# Patient Record
Sex: MALE | Race: WHITE | NOT HISPANIC OR LATINO | Employment: UNEMPLOYED | URBAN - NONMETROPOLITAN AREA
[De-identification: names, ages, dates, MRNs, and addresses within clinical notes are randomized per-mention and may not be internally consistent; named-entity substitution may affect disease eponyms.]

---

## 2017-02-09 ENCOUNTER — HOSPITAL ENCOUNTER (OUTPATIENT)
Facility: HOSPITAL | Age: 52
Setting detail: HOSPITAL OUTPATIENT SURGERY
End: 2017-02-09
Attending: SURGERY | Admitting: SURGERY

## 2017-02-09 ENCOUNTER — OFFICE VISIT (OUTPATIENT)
Dept: SURGERY | Facility: CLINIC | Age: 52
End: 2017-02-09

## 2017-02-09 VITALS
DIASTOLIC BLOOD PRESSURE: 84 MMHG | HEIGHT: 73 IN | HEART RATE: 80 BPM | TEMPERATURE: 98 F | BODY MASS INDEX: 26.51 KG/M2 | SYSTOLIC BLOOD PRESSURE: 122 MMHG | WEIGHT: 200 LBS

## 2017-02-09 DIAGNOSIS — Z12.11 ENCOUNTER FOR SCREENING COLONOSCOPY: Primary | ICD-10-CM

## 2017-02-09 PROCEDURE — S0260 H&P FOR SURGERY: HCPCS | Performed by: SURGERY

## 2017-02-09 RX ORDER — HYDROCODONE BITARTRATE AND ACETAMINOPHEN 10; 325 MG/1; MG/1
1 TABLET ORAL EVERY 6 HOURS PRN
COMMUNITY
Start: 2017-02-02

## 2017-02-09 RX ORDER — PRAVASTATIN SODIUM 40 MG
40 TABLET ORAL DAILY
COMMUNITY
Start: 2017-02-02

## 2017-02-16 VITALS — WEIGHT: 205 LBS | BODY MASS INDEX: 27.17 KG/M2 | HEIGHT: 73 IN

## 2017-02-16 RX ORDER — ALBUTEROL SULFATE 90 UG/1
2 AEROSOL, METERED RESPIRATORY (INHALATION) EVERY 4 HOURS PRN
COMMUNITY
End: 2018-11-29 | Stop reason: HOSPADM

## 2017-02-20 ENCOUNTER — OUTSIDE FACILITY SERVICE (OUTPATIENT)
Dept: CARDIOLOGY | Facility: CLINIC | Age: 52
End: 2017-02-20

## 2017-02-20 PROCEDURE — 93306 TTE W/DOPPLER COMPLETE: CPT | Performed by: INTERNAL MEDICINE

## 2017-02-27 ENCOUNTER — TELEPHONE (OUTPATIENT)
Dept: SURGERY | Facility: CLINIC | Age: 52
End: 2017-02-27

## 2017-03-01 ENCOUNTER — TELEPHONE (OUTPATIENT)
Dept: SURGERY | Facility: CLINIC | Age: 52
End: 2017-03-01

## 2017-04-24 ENCOUNTER — PREP FOR SURGERY (OUTPATIENT)
Dept: SURGERY | Facility: CLINIC | Age: 52
End: 2017-04-24

## 2017-04-24 DIAGNOSIS — Z12.11 ENCOUNTER FOR SCREENING COLONOSCOPY: Primary | ICD-10-CM

## 2017-05-09 ENCOUNTER — TELEPHONE (OUTPATIENT)
Dept: SURGERY | Facility: CLINIC | Age: 52
End: 2017-05-09

## 2017-05-10 ENCOUNTER — HOSPITAL ENCOUNTER (OUTPATIENT)
Dept: RADIATION ONCOLOGY | Facility: HOSPITAL | Age: 52
Setting detail: RADIATION/ONCOLOGY SERIES
Discharge: HOME | End: 2017-05-10

## 2017-05-11 ENCOUNTER — PREP FOR SURGERY (OUTPATIENT)
Dept: SURGERY | Facility: CLINIC | Age: 52
End: 2017-05-11

## 2017-05-11 DIAGNOSIS — Z12.11 ENCOUNTER FOR SCREENING COLONOSCOPY: Primary | ICD-10-CM

## 2017-05-11 RX ORDER — SODIUM CHLORIDE 9 MG/ML
30 INJECTION, SOLUTION INTRAVENOUS CONTINUOUS PRN
Status: CANCELLED | OUTPATIENT
Start: 2017-05-11

## 2017-05-15 ENCOUNTER — TELEPHONE (OUTPATIENT)
Dept: SURGERY | Facility: CLINIC | Age: 52
End: 2017-05-15

## 2021-04-13 ENCOUNTER — TRANSCRIBE ORDERS (OUTPATIENT)
Dept: ADMINISTRATIVE | Facility: HOSPITAL | Age: 56
End: 2021-04-13

## 2021-04-13 DIAGNOSIS — Z87.891 PERSONAL HISTORY OF TOBACCO USE, PRESENTING HAZARDS TO HEALTH: Primary | ICD-10-CM

## 2021-04-23 ENCOUNTER — HOSPITAL ENCOUNTER (OUTPATIENT)
Dept: CT IMAGING | Facility: HOSPITAL | Age: 56
Discharge: HOME | End: 2021-04-23
Admitting: FAMILY MEDICINE

## 2021-04-23 DIAGNOSIS — Z87.891 PERSONAL HISTORY OF TOBACCO USE, PRESENTING HAZARDS TO HEALTH: ICD-10-CM

## 2021-04-23 PROCEDURE — 71271 CT THORAX LUNG CANCER SCR C-: CPT | Performed by: RADIOLOGY

## 2021-04-23 PROCEDURE — 71271 CT THORAX LUNG CANCER SCR C-: CPT

## 2023-04-14 ENCOUNTER — LAB (OUTPATIENT)
Dept: LAB | Facility: HOSPITAL | Age: 58
End: 2023-04-14
Payer: MEDICARE

## 2023-04-14 ENCOUNTER — TRANSCRIBE ORDERS (OUTPATIENT)
Dept: LAB | Facility: HOSPITAL | Age: 58
End: 2023-04-14
Payer: MEDICARE

## 2023-04-14 ENCOUNTER — HOSPITAL ENCOUNTER (OUTPATIENT)
Dept: GENERAL RADIOLOGY | Facility: HOSPITAL | Age: 58
Discharge: HOME | End: 2023-04-14
Payer: MEDICARE

## 2023-04-14 DIAGNOSIS — R05.9 COUGH, UNSPECIFIED TYPE: ICD-10-CM

## 2023-04-14 DIAGNOSIS — R97.20 ELEVATED PSA: Primary | ICD-10-CM

## 2023-04-14 DIAGNOSIS — M54.2 CERVICALGIA: ICD-10-CM

## 2023-04-14 DIAGNOSIS — M54.2 CERVICALGIA: Primary | ICD-10-CM

## 2023-04-14 DIAGNOSIS — R05.9 COUGH, UNSPECIFIED TYPE: Primary | ICD-10-CM

## 2023-04-14 DIAGNOSIS — R97.20 ELEVATED PSA: ICD-10-CM

## 2023-04-14 PROCEDURE — 71046 X-RAY EXAM CHEST 2 VIEWS: CPT

## 2023-04-14 PROCEDURE — 72040 X-RAY EXAM NECK SPINE 2-3 VW: CPT

## 2023-04-14 PROCEDURE — 72040 X-RAY EXAM NECK SPINE 2-3 VW: CPT | Performed by: RADIOLOGY

## 2023-04-14 PROCEDURE — 71046 X-RAY EXAM CHEST 2 VIEWS: CPT | Performed by: RADIOLOGY

## 2024-01-31 ENCOUNTER — TRANSCRIBE ORDERS (OUTPATIENT)
Dept: ADMINISTRATIVE | Facility: HOSPITAL | Age: 59
End: 2024-01-31
Payer: MEDICARE

## 2024-01-31 ENCOUNTER — HOSPITAL ENCOUNTER (OUTPATIENT)
Dept: GENERAL RADIOLOGY | Facility: HOSPITAL | Age: 59
Discharge: HOME | End: 2024-01-31
Payer: MEDICARE

## 2024-01-31 DIAGNOSIS — M54.50 LOW BACK PAIN, UNSPECIFIED BACK PAIN LATERALITY, UNSPECIFIED CHRONICITY, UNSPECIFIED WHETHER SCIATICA PRESENT: ICD-10-CM

## 2024-01-31 DIAGNOSIS — R05.9 COUGH, UNSPECIFIED TYPE: ICD-10-CM

## 2024-01-31 DIAGNOSIS — R05.9 COUGH, UNSPECIFIED TYPE: Primary | ICD-10-CM

## 2024-01-31 DIAGNOSIS — M54.50 LOW BACK PAIN, UNSPECIFIED BACK PAIN LATERALITY, UNSPECIFIED CHRONICITY, UNSPECIFIED WHETHER SCIATICA PRESENT: Primary | ICD-10-CM

## 2024-01-31 PROCEDURE — 72100 X-RAY EXAM L-S SPINE 2/3 VWS: CPT

## 2024-01-31 PROCEDURE — 71046 X-RAY EXAM CHEST 2 VIEWS: CPT | Performed by: RADIOLOGY

## 2024-01-31 PROCEDURE — 72100 X-RAY EXAM L-S SPINE 2/3 VWS: CPT | Performed by: RADIOLOGY

## 2024-01-31 PROCEDURE — 71046 X-RAY EXAM CHEST 2 VIEWS: CPT

## 2024-02-13 ENCOUNTER — TRANSCRIBE ORDERS (OUTPATIENT)
Dept: ADMINISTRATIVE | Facility: HOSPITAL | Age: 59
End: 2024-02-13
Payer: MEDICARE

## 2024-02-13 DIAGNOSIS — M54.16 RADICULOPATHY, LUMBAR REGION: Primary | ICD-10-CM

## 2024-02-21 ENCOUNTER — HOSPITAL ENCOUNTER (OUTPATIENT)
Dept: MRI IMAGING | Facility: HOSPITAL | Age: 59
Discharge: HOME | End: 2024-02-21
Payer: MEDICARE

## 2024-02-21 DIAGNOSIS — M54.16 RADICULOPATHY, LUMBAR REGION: ICD-10-CM

## 2024-02-21 PROCEDURE — 72148 MRI LUMBAR SPINE W/O DYE: CPT

## 2024-02-21 PROCEDURE — 72148 MRI LUMBAR SPINE W/O DYE: CPT | Performed by: RADIOLOGY

## 2024-09-04 ENCOUNTER — APPOINTMENT (OUTPATIENT)
Dept: GENERAL RADIOLOGY | Facility: HOSPITAL | Age: 59
DRG: 871 | End: 2024-09-04
Payer: MEDICARE

## 2024-09-04 ENCOUNTER — HOSPITAL ENCOUNTER (INPATIENT)
Facility: HOSPITAL | Age: 59
LOS: 4 days | Discharge: HOME | DRG: 871 | End: 2024-09-08
Attending: STUDENT IN AN ORGANIZED HEALTH CARE EDUCATION/TRAINING PROGRAM | Admitting: STUDENT IN AN ORGANIZED HEALTH CARE EDUCATION/TRAINING PROGRAM
Payer: MEDICARE

## 2024-09-04 ENCOUNTER — APPOINTMENT (OUTPATIENT)
Dept: CARDIOLOGY | Facility: HOSPITAL | Age: 59
DRG: 871 | End: 2024-09-04
Payer: MEDICARE

## 2024-09-04 ENCOUNTER — APPOINTMENT (OUTPATIENT)
Dept: CT IMAGING | Facility: HOSPITAL | Age: 59
DRG: 871 | End: 2024-09-04
Payer: MEDICARE

## 2024-09-04 DIAGNOSIS — J44.9 CHRONIC OBSTRUCTIVE PULMONARY DISEASE (COPD): ICD-10-CM

## 2024-09-04 DIAGNOSIS — J96.01 ACUTE HYPOXEMIC RESPIRATORY FAILURE: ICD-10-CM

## 2024-09-04 DIAGNOSIS — J96.22 ACUTE ON CHRONIC RESPIRATORY FAILURE WITH HYPOXIA AND HYPERCAPNIA: Primary | ICD-10-CM

## 2024-09-04 DIAGNOSIS — J44.1 COPD EXACERBATION: ICD-10-CM

## 2024-09-04 DIAGNOSIS — J96.21 ACUTE ON CHRONIC RESPIRATORY FAILURE WITH HYPOXIA AND HYPERCAPNIA: Primary | ICD-10-CM

## 2024-09-04 DIAGNOSIS — J18.9 PNEUMONIA DUE TO INFECTIOUS ORGANISM, UNSPECIFIED LATERALITY, UNSPECIFIED PART OF LUNG: ICD-10-CM

## 2024-09-04 DIAGNOSIS — J96.10 CHRONIC RESPIRATORY FAILURE: ICD-10-CM

## 2024-09-04 LAB
A-A DO2: 204.4 MMHG (ref 0–300)
A-A DO2: 77.1 MMHG (ref 0–300)
ALBUMIN SERPL-MCNC: 3.7 G/DL (ref 3.5–5.2)
ALBUMIN/GLOB SERPL: 0.9 G/DL
ALP SERPL-CCNC: 87 U/L (ref 39–117)
ALT SERPL W P-5'-P-CCNC: 15 U/L (ref 1–41)
ANION GAP SERPL CALCULATED.3IONS-SCNC: 9.9 MMOL/L (ref 5–15)
ARTERIAL PATENCY WRIST A: ABNORMAL
ARTERIAL PATENCY WRIST A: POSITIVE
AST SERPL-CCNC: 16 U/L (ref 1–40)
ATMOSPHERIC PRESS: 734 MMHG
ATMOSPHERIC PRESS: 734 MMHG
B PARAPERT DNA SPEC QL NAA+PROBE: NOT DETECTED
B PERT DNA SPEC QL NAA+PROBE: NOT DETECTED
BASE EXCESS BLDA CALC-SCNC: 7.7 MMOL/L (ref 0–2)
BASE EXCESS BLDA CALC-SCNC: 7.9 MMOL/L (ref 0–2)
BASOPHILS # BLD AUTO: 0.09 10*3/MM3 (ref 0–0.2)
BASOPHILS NFR BLD AUTO: 0.4 % (ref 0–1.5)
BDY SITE: ABNORMAL
BDY SITE: ABNORMAL
BH CV ECHO MEAS - AO MAX PG: 7.4 MMHG
BH CV ECHO MEAS - AO MEAN PG: 4 MMHG
BH CV ECHO MEAS - AO ROOT DIAM: 4.2 CM
BH CV ECHO MEAS - AO V2 MAX: 136 CM/SEC
BH CV ECHO MEAS - AO V2 VTI: 26.6 CM
BH CV ECHO MEAS - EDV(CUBED): 75.2 ML
BH CV ECHO MEAS - EDV(MOD-SP4): 60.1 ML
BH CV ECHO MEAS - EF(MOD-SP4): 58.9 %
BH CV ECHO MEAS - ESV(CUBED): 28.4 ML
BH CV ECHO MEAS - ESV(MOD-SP4): 24.7 ML
BH CV ECHO MEAS - FS: 27.7 %
BH CV ECHO MEAS - IVS/LVPW: 0.96 CM
BH CV ECHO MEAS - IVSD: 1.28 CM
BH CV ECHO MEAS - LA DIMENSION: 2.8 CM
BH CV ECHO MEAS - LV DIASTOLIC VOL/BSA (35-75): 26.8 CM2
BH CV ECHO MEAS - LV MASS(C)D: 203.2 GRAMS
BH CV ECHO MEAS - LV SYSTOLIC VOL/BSA (12-30): 11 CM2
BH CV ECHO MEAS - LVIDD: 4.2 CM
BH CV ECHO MEAS - LVIDS: 3.1 CM
BH CV ECHO MEAS - LVOT AREA: 2.8 CM2
BH CV ECHO MEAS - LVOT DIAM: 1.9 CM
BH CV ECHO MEAS - LVPWD: 1.33 CM
BH CV ECHO MEAS - PA ACC TIME: 0.12 SEC
BH CV ECHO MEAS - RAP SYSTOLE: 10 MMHG
BH CV ECHO MEAS - RVSP: 50.4 MMHG
BH CV ECHO MEAS - SV(MOD-SP4): 35.4 ML
BH CV ECHO MEAS - SVI(MOD-SP4): 15.8 ML/M2
BH CV ECHO MEAS - TR MAX PG: 40.4 MMHG
BH CV ECHO MEAS - TR MAX VEL: 318 CM/SEC
BILIRUB SERPL-MCNC: 0.3 MG/DL (ref 0–1.2)
BUN SERPL-MCNC: 11 MG/DL (ref 8–23)
BUN/CREAT SERPL: 15.1 (ref 7–25)
C PNEUM DNA NPH QL NAA+NON-PROBE: NOT DETECTED
CALCIUM SPEC-SCNC: 9.5 MG/DL (ref 8.6–10.5)
CHLORIDE SERPL-SCNC: 92 MMOL/L (ref 98–107)
CO2 BLDA-SCNC: 38.6 MMOL/L (ref 22–33)
CO2 BLDA-SCNC: 38.6 MMOL/L (ref 22–33)
CO2 SERPL-SCNC: 33.1 MMOL/L (ref 22–29)
COHGB MFR BLD: 2.9 % (ref 0–5)
COHGB MFR BLD: 3.1 % (ref 0–5)
CREAT SERPL-MCNC: 0.73 MG/DL (ref 0.76–1.27)
CRP SERPL-MCNC: 10.39 MG/DL (ref 0–0.5)
D DIMER PPP FEU-MCNC: 0.39 MCGFEU/ML (ref 0–0.6)
D-LACTATE SERPL-SCNC: 0.9 MMOL/L (ref 0.5–2)
DEPRECATED RDW RBC AUTO: 48.9 FL (ref 37–54)
EGFRCR SERPLBLD CKD-EPI 2021: 104.2 ML/MIN/1.73
EOSINOPHIL # BLD AUTO: 0 10*3/MM3 (ref 0–0.4)
EOSINOPHIL NFR BLD AUTO: 0 % (ref 0.3–6.2)
ERYTHROCYTE [DISTWIDTH] IN BLOOD BY AUTOMATED COUNT: 13.5 % (ref 12.3–15.4)
ERYTHROCYTE [SEDIMENTATION RATE] IN BLOOD: 42 MM/HR (ref 0–20)
FLUAV SUBTYP SPEC NAA+PROBE: NOT DETECTED
FLUBV RNA ISLT QL NAA+PROBE: NOT DETECTED
GAS FLOW AIRWAY: 2 LPM
GAS FLOW AIRWAY: 60 LPM
GEN 5 2HR TROPONIN T REFLEX: 11 NG/L
GLOBULIN UR ELPH-MCNC: 4.1 GM/DL
GLUCOSE SERPL-MCNC: 161 MG/DL (ref 65–99)
HADV DNA SPEC NAA+PROBE: NOT DETECTED
HCO3 BLDA-SCNC: 36.5 MMOL/L (ref 20–26)
HCO3 BLDA-SCNC: 36.6 MMOL/L (ref 20–26)
HCOV 229E RNA SPEC QL NAA+PROBE: NOT DETECTED
HCOV HKU1 RNA SPEC QL NAA+PROBE: NOT DETECTED
HCOV NL63 RNA SPEC QL NAA+PROBE: NOT DETECTED
HCOV OC43 RNA SPEC QL NAA+PROBE: NOT DETECTED
HCT VFR BLD AUTO: 50.4 % (ref 37.5–51)
HCT VFR BLD CALC: 46.8 % (ref 38–51)
HCT VFR BLD CALC: 48.1 % (ref 38–51)
HGB BLD-MCNC: 15.8 G/DL (ref 13–17.7)
HGB BLDA-MCNC: 15.3 G/DL (ref 14–18)
HGB BLDA-MCNC: 15.7 G/DL (ref 14–18)
HMPV RNA NPH QL NAA+NON-PROBE: NOT DETECTED
HOLD SPECIMEN: NORMAL
HOLD SPECIMEN: NORMAL
HPIV1 RNA ISLT QL NAA+PROBE: NOT DETECTED
HPIV2 RNA SPEC QL NAA+PROBE: NOT DETECTED
HPIV3 RNA NPH QL NAA+PROBE: NOT DETECTED
HPIV4 P GENE NPH QL NAA+PROBE: NOT DETECTED
IMM GRANULOCYTES # BLD AUTO: 0.17 10*3/MM3 (ref 0–0.05)
IMM GRANULOCYTES NFR BLD AUTO: 0.7 % (ref 0–0.5)
INHALED O2 CONCENTRATION: 28 %
INHALED O2 CONCENTRATION: 51 %
INR PPP: 1.09 (ref 0.9–1.1)
LYMPHOCYTES # BLD AUTO: 1.18 10*3/MM3 (ref 0.7–3.1)
LYMPHOCYTES NFR BLD AUTO: 4.6 % (ref 19.6–45.3)
Lab: ABNORMAL
M PNEUMO IGG SER IA-ACNC: NOT DETECTED
MCH RBC QN AUTO: 30.6 PG (ref 26.6–33)
MCHC RBC AUTO-ENTMCNC: 31.3 G/DL (ref 31.5–35.7)
MCV RBC AUTO: 97.5 FL (ref 79–97)
METHGB BLD QL: 0.2 % (ref 0–3)
METHGB BLD QL: 0.2 % (ref 0–3)
MODALITY: ABNORMAL
MODALITY: ABNORMAL
MONOCYTES # BLD AUTO: 1.35 10*3/MM3 (ref 0.1–0.9)
MONOCYTES NFR BLD AUTO: 5.3 % (ref 5–12)
MRSA DNA SPEC QL NAA+PROBE: ABNORMAL
NEUTROPHILS NFR BLD AUTO: 22.61 10*3/MM3 (ref 1.7–7)
NEUTROPHILS NFR BLD AUTO: 89 % (ref 42.7–76)
NOTIFIED BY: ABNORMAL
NOTIFIED BY: ABNORMAL
NOTIFIED WHO: ABNORMAL
NOTIFIED WHO: ABNORMAL
NRBC BLD AUTO-RTO: 0 /100 WBC (ref 0–0.2)
NT-PROBNP SERPL-MCNC: 458.9 PG/ML (ref 0–900)
OXYHGB MFR BLDV: 69.3 % (ref 94–99)
OXYHGB MFR BLDV: 91 % (ref 94–99)
PCO2 BLDA: 67.7 MM HG (ref 35–45)
PCO2 BLDA: 67.8 MM HG (ref 35–45)
PCO2 TEMP ADJ BLD: ABNORMAL MM[HG]
PCO2 TEMP ADJ BLD: ABNORMAL MM[HG]
PH BLDA: 7.34 PH UNITS (ref 7.35–7.45)
PH BLDA: 7.34 PH UNITS (ref 7.35–7.45)
PH, TEMP CORRECTED: ABNORMAL
PH, TEMP CORRECTED: ABNORMAL
PLATELET # BLD AUTO: 388 10*3/MM3 (ref 140–450)
PMV BLD AUTO: 9.6 FL (ref 6–12)
PO2 BLDA: 39.5 MM HG (ref 83–108)
PO2 BLDA: 73 MM HG (ref 83–108)
PO2 TEMP ADJ BLD: ABNORMAL MM[HG]
PO2 TEMP ADJ BLD: ABNORMAL MM[HG]
POTASSIUM SERPL-SCNC: 4.1 MMOL/L (ref 3.5–5.2)
PROCALCITONIN SERPL-MCNC: 0.06 NG/ML (ref 0–0.25)
PROT SERPL-MCNC: 7.8 G/DL (ref 6–8.5)
PROTHROMBIN TIME: 14.3 SECONDS (ref 12.1–14.7)
QT INTERVAL: 368 MS
QTC INTERVAL: 467 MS
RBC # BLD AUTO: 5.17 10*6/MM3 (ref 4.14–5.8)
RHINOVIRUS RNA SPEC NAA+PROBE: DETECTED
RSV RNA NPH QL NAA+NON-PROBE: NOT DETECTED
SAO2 % BLDCOA: 71.7 % (ref 94–99)
SAO2 % BLDCOA: 93.9 % (ref 94–99)
SARS-COV-2 RNA NPH QL NAA+NON-PROBE: NOT DETECTED
SODIUM SERPL-SCNC: 135 MMOL/L (ref 136–145)
TROPONIN T DELTA: -3 NG/L
TROPONIN T SERPL HS-MCNC: 14 NG/L
TROPONIN T SERPL HS-MCNC: 17 NG/L
VENTILATOR MODE: ABNORMAL
VENTILATOR MODE: ABNORMAL
WBC NRBC COR # BLD AUTO: 25.4 10*3/MM3 (ref 3.4–10.8)
WHOLE BLOOD HOLD COAG: NORMAL
WHOLE BLOOD HOLD SPECIMEN: NORMAL

## 2024-09-04 PROCEDURE — 71275 CT ANGIOGRAPHY CHEST: CPT | Performed by: RADIOLOGY

## 2024-09-04 PROCEDURE — 85025 COMPLETE CBC W/AUTO DIFF WBC: CPT | Performed by: STUDENT IN AN ORGANIZED HEALTH CARE EDUCATION/TRAINING PROGRAM

## 2024-09-04 PROCEDURE — 87641 MR-STAPH DNA AMP PROBE: CPT | Performed by: STUDENT IN AN ORGANIZED HEALTH CARE EDUCATION/TRAINING PROGRAM

## 2024-09-04 PROCEDURE — 99285 EMERGENCY DEPT VISIT HI MDM: CPT

## 2024-09-04 PROCEDURE — 93306 TTE W/DOPPLER COMPLETE: CPT

## 2024-09-04 PROCEDURE — 25010000002 METHYLPREDNISOLONE PER 125 MG: Performed by: STUDENT IN AN ORGANIZED HEALTH CARE EDUCATION/TRAINING PROGRAM

## 2024-09-04 PROCEDURE — 82375 ASSAY CARBOXYHB QUANT: CPT

## 2024-09-04 PROCEDURE — 71045 X-RAY EXAM CHEST 1 VIEW: CPT | Performed by: RADIOLOGY

## 2024-09-04 PROCEDURE — 71275 CT ANGIOGRAPHY CHEST: CPT

## 2024-09-04 PROCEDURE — 93010 ELECTROCARDIOGRAM REPORT: CPT | Performed by: STUDENT IN AN ORGANIZED HEALTH CARE EDUCATION/TRAINING PROGRAM

## 2024-09-04 PROCEDURE — 86738 MYCOPLASMA ANTIBODY: CPT | Performed by: STUDENT IN AN ORGANIZED HEALTH CARE EDUCATION/TRAINING PROGRAM

## 2024-09-04 PROCEDURE — 93005 ELECTROCARDIOGRAM TRACING: CPT | Performed by: STUDENT IN AN ORGANIZED HEALTH CARE EDUCATION/TRAINING PROGRAM

## 2024-09-04 PROCEDURE — 25510000001 IOPAMIDOL PER 1 ML: Performed by: STUDENT IN AN ORGANIZED HEALTH CARE EDUCATION/TRAINING PROGRAM

## 2024-09-04 PROCEDURE — 83050 HGB METHEMOGLOBIN QUAN: CPT

## 2024-09-04 PROCEDURE — 36415 COLL VENOUS BLD VENIPUNCTURE: CPT | Performed by: STUDENT IN AN ORGANIZED HEALTH CARE EDUCATION/TRAINING PROGRAM

## 2024-09-04 PROCEDURE — 94664 DEMO&/EVAL PT USE INHALER: CPT

## 2024-09-04 PROCEDURE — 25010000002 VANCOMYCIN 5 G RECONSTITUTED SOLUTION: Performed by: STUDENT IN AN ORGANIZED HEALTH CARE EDUCATION/TRAINING PROGRAM

## 2024-09-04 PROCEDURE — 85610 PROTHROMBIN TIME: CPT | Performed by: STUDENT IN AN ORGANIZED HEALTH CARE EDUCATION/TRAINING PROGRAM

## 2024-09-04 PROCEDURE — 80053 COMPREHEN METABOLIC PANEL: CPT | Performed by: STUDENT IN AN ORGANIZED HEALTH CARE EDUCATION/TRAINING PROGRAM

## 2024-09-04 PROCEDURE — 94799 UNLISTED PULMONARY SVC/PX: CPT

## 2024-09-04 PROCEDURE — 25010000002 AZITHROMYCIN PER 500 MG: Performed by: STUDENT IN AN ORGANIZED HEALTH CARE EDUCATION/TRAINING PROGRAM

## 2024-09-04 PROCEDURE — 83605 ASSAY OF LACTIC ACID: CPT | Performed by: STUDENT IN AN ORGANIZED HEALTH CARE EDUCATION/TRAINING PROGRAM

## 2024-09-04 PROCEDURE — 83880 ASSAY OF NATRIURETIC PEPTIDE: CPT | Performed by: STUDENT IN AN ORGANIZED HEALTH CARE EDUCATION/TRAINING PROGRAM

## 2024-09-04 PROCEDURE — 25810000003 SODIUM CHLORIDE 0.9 % SOLUTION 250 ML FLEX CONT: Performed by: STUDENT IN AN ORGANIZED HEALTH CARE EDUCATION/TRAINING PROGRAM

## 2024-09-04 PROCEDURE — 85652 RBC SED RATE AUTOMATED: CPT | Performed by: STUDENT IN AN ORGANIZED HEALTH CARE EDUCATION/TRAINING PROGRAM

## 2024-09-04 PROCEDURE — 0202U NFCT DS 22 TRGT SARS-COV-2: CPT | Performed by: STUDENT IN AN ORGANIZED HEALTH CARE EDUCATION/TRAINING PROGRAM

## 2024-09-04 PROCEDURE — 84484 ASSAY OF TROPONIN QUANT: CPT | Performed by: STUDENT IN AN ORGANIZED HEALTH CARE EDUCATION/TRAINING PROGRAM

## 2024-09-04 PROCEDURE — 25810000003 SODIUM CHLORIDE 0.9 % SOLUTION: Performed by: STUDENT IN AN ORGANIZED HEALTH CARE EDUCATION/TRAINING PROGRAM

## 2024-09-04 PROCEDURE — 25010000002 CEFTRIAXONE PER 250 MG: Performed by: STUDENT IN AN ORGANIZED HEALTH CARE EDUCATION/TRAINING PROGRAM

## 2024-09-04 PROCEDURE — 36600 WITHDRAWAL OF ARTERIAL BLOOD: CPT

## 2024-09-04 PROCEDURE — 87070 CULTURE OTHR SPECIMN AEROBIC: CPT | Performed by: STUDENT IN AN ORGANIZED HEALTH CARE EDUCATION/TRAINING PROGRAM

## 2024-09-04 PROCEDURE — 25010000002 CEFEPIME PER 500 MG: Performed by: STUDENT IN AN ORGANIZED HEALTH CARE EDUCATION/TRAINING PROGRAM

## 2024-09-04 PROCEDURE — 99291 CRITICAL CARE FIRST HOUR: CPT | Performed by: STUDENT IN AN ORGANIZED HEALTH CARE EDUCATION/TRAINING PROGRAM

## 2024-09-04 PROCEDURE — 71045 X-RAY EXAM CHEST 1 VIEW: CPT

## 2024-09-04 PROCEDURE — 87205 SMEAR GRAM STAIN: CPT | Performed by: STUDENT IN AN ORGANIZED HEALTH CARE EDUCATION/TRAINING PROGRAM

## 2024-09-04 PROCEDURE — 94640 AIRWAY INHALATION TREATMENT: CPT

## 2024-09-04 PROCEDURE — 87077 CULTURE AEROBIC IDENTIFY: CPT | Performed by: STUDENT IN AN ORGANIZED HEALTH CARE EDUCATION/TRAINING PROGRAM

## 2024-09-04 PROCEDURE — 36415 COLL VENOUS BLD VENIPUNCTURE: CPT

## 2024-09-04 PROCEDURE — 85379 FIBRIN DEGRADATION QUANT: CPT | Performed by: STUDENT IN AN ORGANIZED HEALTH CARE EDUCATION/TRAINING PROGRAM

## 2024-09-04 PROCEDURE — 87040 BLOOD CULTURE FOR BACTERIA: CPT | Performed by: STUDENT IN AN ORGANIZED HEALTH CARE EDUCATION/TRAINING PROGRAM

## 2024-09-04 PROCEDURE — 93306 TTE W/DOPPLER COMPLETE: CPT | Performed by: INTERNAL MEDICINE

## 2024-09-04 PROCEDURE — 84145 PROCALCITONIN (PCT): CPT | Performed by: STUDENT IN AN ORGANIZED HEALTH CARE EDUCATION/TRAINING PROGRAM

## 2024-09-04 PROCEDURE — 82805 BLOOD GASES W/O2 SATURATION: CPT

## 2024-09-04 PROCEDURE — 86140 C-REACTIVE PROTEIN: CPT | Performed by: STUDENT IN AN ORGANIZED HEALTH CARE EDUCATION/TRAINING PROGRAM

## 2024-09-04 PROCEDURE — 87185 SC STD ENZYME DETCJ PER NZM: CPT | Performed by: STUDENT IN AN ORGANIZED HEALTH CARE EDUCATION/TRAINING PROGRAM

## 2024-09-04 PROCEDURE — 25010000002 ENOXAPARIN PER 10 MG: Performed by: STUDENT IN AN ORGANIZED HEALTH CARE EDUCATION/TRAINING PROGRAM

## 2024-09-04 RX ORDER — ALBUTEROL SULFATE 90 UG/1
2 AEROSOL, METERED RESPIRATORY (INHALATION) EVERY 6 HOURS PRN
Status: CANCELLED | OUTPATIENT
Start: 2024-09-04

## 2024-09-04 RX ORDER — PREDNISONE 20 MG/1
TABLET ORAL
COMMUNITY
Start: 2024-08-28 | End: 2024-09-08 | Stop reason: HOSPADM

## 2024-09-04 RX ORDER — BISACODYL 5 MG/1
5 TABLET, DELAYED RELEASE ORAL DAILY PRN
Status: DISCONTINUED | OUTPATIENT
Start: 2024-09-04 | End: 2024-09-08 | Stop reason: HOSPADM

## 2024-09-04 RX ORDER — IPRATROPIUM BROMIDE AND ALBUTEROL SULFATE 2.5; .5 MG/3ML; MG/3ML
SOLUTION RESPIRATORY (INHALATION)
Status: COMPLETED
Start: 2024-09-04 | End: 2024-09-04

## 2024-09-04 RX ORDER — AMOXICILLIN 250 MG
2 CAPSULE ORAL 2 TIMES DAILY PRN
Status: DISCONTINUED | OUTPATIENT
Start: 2024-09-04 | End: 2024-09-08 | Stop reason: HOSPADM

## 2024-09-04 RX ORDER — METHYLPREDNISOLONE SODIUM SUCCINATE 125 MG/2ML
125 INJECTION, POWDER, LYOPHILIZED, FOR SOLUTION INTRAMUSCULAR; INTRAVENOUS ONCE
Status: COMPLETED | OUTPATIENT
Start: 2024-09-04 | End: 2024-09-04

## 2024-09-04 RX ORDER — METHYLPREDNISOLONE SODIUM SUCCINATE 125 MG/2ML
60 INJECTION, POWDER, LYOPHILIZED, FOR SOLUTION INTRAMUSCULAR; INTRAVENOUS EVERY 8 HOURS
Status: DISCONTINUED | OUTPATIENT
Start: 2024-09-04 | End: 2024-09-06

## 2024-09-04 RX ORDER — PANTOPRAZOLE SODIUM 40 MG/1
40 TABLET, DELAYED RELEASE ORAL
Status: DISCONTINUED | OUTPATIENT
Start: 2024-09-05 | End: 2024-09-08 | Stop reason: HOSPADM

## 2024-09-04 RX ORDER — DOXYCYCLINE 100 MG/1
100 CAPSULE ORAL EVERY 12 HOURS SCHEDULED
Status: DISCONTINUED | OUTPATIENT
Start: 2024-09-04 | End: 2024-09-08 | Stop reason: HOSPADM

## 2024-09-04 RX ORDER — SODIUM CHLORIDE 9 MG/ML
40 INJECTION, SOLUTION INTRAVENOUS AS NEEDED
Status: DISCONTINUED | OUTPATIENT
Start: 2024-09-04 | End: 2024-09-08 | Stop reason: HOSPADM

## 2024-09-04 RX ORDER — SODIUM CHLORIDE 0.9 % (FLUSH) 0.9 %
10 SYRINGE (ML) INJECTION EVERY 12 HOURS SCHEDULED
Status: DISCONTINUED | OUTPATIENT
Start: 2024-09-04 | End: 2024-09-08 | Stop reason: HOSPADM

## 2024-09-04 RX ORDER — GUAIFENESIN 600 MG/1
1200 TABLET, EXTENDED RELEASE ORAL EVERY 12 HOURS SCHEDULED
Status: DISCONTINUED | OUTPATIENT
Start: 2024-09-04 | End: 2024-09-08 | Stop reason: HOSPADM

## 2024-09-04 RX ORDER — FLUTICASONE FUROATE, UMECLIDINIUM BROMIDE AND VILANTEROL TRIFENATATE 200; 62.5; 25 UG/1; UG/1; UG/1
1 POWDER RESPIRATORY (INHALATION)
COMMUNITY
Start: 2024-08-02 | End: 2024-09-11 | Stop reason: SDUPTHER

## 2024-09-04 RX ORDER — NITROGLYCERIN 0.4 MG/1
0.4 TABLET SUBLINGUAL
Status: DISCONTINUED | OUTPATIENT
Start: 2024-09-04 | End: 2024-09-08 | Stop reason: HOSPADM

## 2024-09-04 RX ORDER — HYDROCODONE BITARTRATE AND ACETAMINOPHEN 10; 325 MG/1; MG/1
1.5 TABLET ORAL DAILY PRN
Status: DISCONTINUED | OUTPATIENT
Start: 2024-09-04 | End: 2024-09-08 | Stop reason: HOSPADM

## 2024-09-04 RX ORDER — POLYETHYLENE GLYCOL 3350 17 G/17G
17 POWDER, FOR SOLUTION ORAL DAILY PRN
Status: DISCONTINUED | OUTPATIENT
Start: 2024-09-04 | End: 2024-09-08 | Stop reason: HOSPADM

## 2024-09-04 RX ORDER — ENOXAPARIN SODIUM 100 MG/ML
40 INJECTION SUBCUTANEOUS DAILY
Status: DISCONTINUED | OUTPATIENT
Start: 2024-09-04 | End: 2024-09-08 | Stop reason: HOSPADM

## 2024-09-04 RX ORDER — IPRATROPIUM BROMIDE AND ALBUTEROL SULFATE 2.5; .5 MG/3ML; MG/3ML
3 SOLUTION RESPIRATORY (INHALATION)
Status: DISCONTINUED | OUTPATIENT
Start: 2024-09-04 | End: 2024-09-07

## 2024-09-04 RX ORDER — IPRATROPIUM BROMIDE AND ALBUTEROL SULFATE 2.5; .5 MG/3ML; MG/3ML
3 SOLUTION RESPIRATORY (INHALATION) ONCE
Status: COMPLETED | OUTPATIENT
Start: 2024-09-04 | End: 2024-09-04

## 2024-09-04 RX ORDER — VANCOMYCIN 2 GRAM/500 ML IN 0.9 % SODIUM CHLORIDE INTRAVENOUS
20 ONCE
Status: COMPLETED | OUTPATIENT
Start: 2024-09-04 | End: 2024-09-04

## 2024-09-04 RX ORDER — SODIUM CHLORIDE 0.9 % (FLUSH) 0.9 %
10 SYRINGE (ML) INJECTION AS NEEDED
Status: DISCONTINUED | OUTPATIENT
Start: 2024-09-04 | End: 2024-09-08 | Stop reason: HOSPADM

## 2024-09-04 RX ORDER — BISACODYL 10 MG
10 SUPPOSITORY, RECTAL RECTAL DAILY PRN
Status: DISCONTINUED | OUTPATIENT
Start: 2024-09-04 | End: 2024-09-08 | Stop reason: HOSPADM

## 2024-09-04 RX ORDER — GABAPENTIN 300 MG/1
300 CAPSULE ORAL 3 TIMES DAILY PRN
Status: DISCONTINUED | OUTPATIENT
Start: 2024-09-04 | End: 2024-09-08 | Stop reason: HOSPADM

## 2024-09-04 RX ORDER — BUDESONIDE AND FORMOTEROL FUMARATE DIHYDRATE 160; 4.5 UG/1; UG/1
2 AEROSOL RESPIRATORY (INHALATION)
Status: DISCONTINUED | OUTPATIENT
Start: 2024-09-04 | End: 2024-09-08 | Stop reason: HOSPADM

## 2024-09-04 RX ORDER — PREDNISONE 20 MG/1
TABLET ORAL
Status: CANCELLED | OUTPATIENT
Start: 2024-09-04

## 2024-09-04 RX ORDER — GABAPENTIN 300 MG/1
300 CAPSULE ORAL 3 TIMES DAILY PRN
COMMUNITY
Start: 2024-08-06

## 2024-09-04 RX ORDER — ACETAMINOPHEN 325 MG/1
650 TABLET ORAL EVERY 6 HOURS PRN
Status: DISCONTINUED | OUTPATIENT
Start: 2024-09-04 | End: 2024-09-08 | Stop reason: HOSPADM

## 2024-09-04 RX ORDER — ALBUTEROL SULFATE 90 UG/1
2 AEROSOL, METERED RESPIRATORY (INHALATION) EVERY 6 HOURS PRN
COMMUNITY

## 2024-09-04 RX ORDER — ONDANSETRON 2 MG/ML
4 INJECTION INTRAMUSCULAR; INTRAVENOUS EVERY 6 HOURS PRN
Status: DISCONTINUED | OUTPATIENT
Start: 2024-09-04 | End: 2024-09-08 | Stop reason: HOSPADM

## 2024-09-04 RX ORDER — IOPAMIDOL 755 MG/ML
100 INJECTION, SOLUTION INTRAVASCULAR
Status: COMPLETED | OUTPATIENT
Start: 2024-09-04 | End: 2024-09-04

## 2024-09-04 RX ORDER — ASPIRIN 81 MG/1
81 TABLET ORAL DAILY
Status: DISCONTINUED | OUTPATIENT
Start: 2024-09-04 | End: 2024-09-08 | Stop reason: HOSPADM

## 2024-09-04 RX ORDER — ASPIRIN 81 MG/1
81 TABLET ORAL DAILY
COMMUNITY

## 2024-09-04 RX ADMIN — DOXYCYCLINE 100 MG: 100 CAPSULE ORAL at 20:25

## 2024-09-04 RX ADMIN — Medication 10 ML: at 20:25

## 2024-09-04 RX ADMIN — AZITHROMYCIN DIHYDRATE 500 MG: 500 INJECTION, POWDER, LYOPHILIZED, FOR SOLUTION INTRAVENOUS at 11:58

## 2024-09-04 RX ADMIN — IPRATROPIUM BROMIDE AND ALBUTEROL SULFATE 3 ML: 2.5; .5 SOLUTION RESPIRATORY (INHALATION) at 11:05

## 2024-09-04 RX ADMIN — CEFEPIME 2000 MG: 2 INJECTION, POWDER, FOR SOLUTION INTRAVENOUS at 13:20

## 2024-09-04 RX ADMIN — GUAIFENESIN 1200 MG: 600 TABLET ORAL at 20:25

## 2024-09-04 RX ADMIN — ASPIRIN 81 MG: 81 TABLET, COATED ORAL at 17:12

## 2024-09-04 RX ADMIN — IPRATROPIUM BROMIDE AND ALBUTEROL SULFATE 3 ML: 2.5; .5 SOLUTION RESPIRATORY (INHALATION) at 18:59

## 2024-09-04 RX ADMIN — VANCOMYCIN HYDROCHLORIDE 2000 MG: 5 INJECTION, POWDER, LYOPHILIZED, FOR SOLUTION INTRAVENOUS at 13:51

## 2024-09-04 RX ADMIN — METHYLPREDNISOLONE SODIUM SUCCINATE 60 MG: 125 INJECTION, POWDER, FOR SOLUTION INTRAMUSCULAR; INTRAVENOUS at 17:11

## 2024-09-04 RX ADMIN — METHYLPREDNISOLONE SODIUM SUCCINATE 125 MG: 125 INJECTION, POWDER, FOR SOLUTION INTRAMUSCULAR; INTRAVENOUS at 11:08

## 2024-09-04 RX ADMIN — CEFEPIME 2000 MG: 2 INJECTION, POWDER, FOR SOLUTION INTRAVENOUS at 17:12

## 2024-09-04 RX ADMIN — IPRATROPIUM BROMIDE AND ALBUTEROL SULFATE 3 ML: 2.5; .5 SOLUTION RESPIRATORY (INHALATION) at 22:08

## 2024-09-04 RX ADMIN — BUDESONIDE AND FORMOTEROL FUMARATE DIHYDRATE 2 PUFF: 160; 4.5 AEROSOL RESPIRATORY (INHALATION) at 18:59

## 2024-09-04 RX ADMIN — ENOXAPARIN SODIUM 40 MG: 100 INJECTION SUBCUTANEOUS at 17:11

## 2024-09-04 RX ADMIN — IOPAMIDOL 100 ML: 755 INJECTION, SOLUTION INTRAVENOUS at 13:09

## 2024-09-04 RX ADMIN — CEFTRIAXONE 1000 MG: 1 INJECTION, POWDER, FOR SOLUTION INTRAMUSCULAR; INTRAVENOUS at 11:53

## 2024-09-05 LAB
A-A DO2: 242 MMHG (ref 0–300)
ANION GAP SERPL CALCULATED.3IONS-SCNC: 8.4 MMOL/L (ref 5–15)
ARTERIAL PATENCY WRIST A: ABNORMAL
ATMOSPHERIC PRESS: 732 MMHG
BASE EXCESS BLDA CALC-SCNC: 9.9 MMOL/L (ref 0–2)
BASOPHILS # BLD AUTO: 0.02 10*3/MM3 (ref 0–0.2)
BASOPHILS NFR BLD AUTO: 0.1 % (ref 0–1.5)
BDY SITE: ABNORMAL
BUN SERPL-MCNC: 9 MG/DL (ref 8–23)
BUN/CREAT SERPL: 13.4 (ref 7–25)
CALCIUM SPEC-SCNC: 8.6 MG/DL (ref 8.6–10.5)
CHLORIDE SERPL-SCNC: 97 MMOL/L (ref 98–107)
CO2 BLDA-SCNC: 42.2 MMOL/L (ref 22–33)
CO2 SERPL-SCNC: 34.6 MMOL/L (ref 22–29)
COHGB MFR BLD: 1.6 % (ref 0–5)
CREAT SERPL-MCNC: 0.67 MG/DL (ref 0.76–1.27)
DEPRECATED RDW RBC AUTO: 48.7 FL (ref 37–54)
EGFRCR SERPLBLD CKD-EPI 2021: 106.9 ML/MIN/1.73
EOSINOPHIL # BLD AUTO: 0 10*3/MM3 (ref 0–0.4)
EOSINOPHIL NFR BLD AUTO: 0 % (ref 0.3–6.2)
ERYTHROCYTE [DISTWIDTH] IN BLOOD BY AUTOMATED COUNT: 13.3 % (ref 12.3–15.4)
GAS FLOW AIRWAY: 45 LPM
GLUCOSE SERPL-MCNC: 174 MG/DL (ref 65–99)
HCO3 BLDA-SCNC: 39.7 MMOL/L (ref 20–26)
HCT VFR BLD AUTO: 43.5 % (ref 37.5–51)
HCT VFR BLD CALC: 43.1 % (ref 38–51)
HGB BLD-MCNC: 13.8 G/DL (ref 13–17.7)
HGB BLDA-MCNC: 14.1 G/DL (ref 14–18)
IMM GRANULOCYTES # BLD AUTO: 0.14 10*3/MM3 (ref 0–0.05)
IMM GRANULOCYTES NFR BLD AUTO: 0.8 % (ref 0–0.5)
INHALED O2 CONCENTRATION: 60 %
L PNEUMO1 AG UR QL IA: NEGATIVE
LYMPHOCYTES # BLD AUTO: 0.7 10*3/MM3 (ref 0.7–3.1)
LYMPHOCYTES NFR BLD AUTO: 4 % (ref 19.6–45.3)
Lab: ABNORMAL
Lab: ABNORMAL
M PNEUMO IGG SER IA-ACNC: 374 U/ML (ref 0–99)
M PNEUMO IGM SER IA-ACNC: 1196 U/ML (ref 0–769)
MCH RBC QN AUTO: 31.4 PG (ref 26.6–33)
MCHC RBC AUTO-ENTMCNC: 31.7 G/DL (ref 31.5–35.7)
MCV RBC AUTO: 98.9 FL (ref 79–97)
METHGB BLD QL: 0.2 % (ref 0–3)
MODALITY: ABNORMAL
MONOCYTES # BLD AUTO: 0.35 10*3/MM3 (ref 0.1–0.9)
MONOCYTES NFR BLD AUTO: 2 % (ref 5–12)
NEUTROPHILS NFR BLD AUTO: 16.5 10*3/MM3 (ref 1.7–7)
NEUTROPHILS NFR BLD AUTO: 93.1 % (ref 42.7–76)
NOTIFIED BY: ABNORMAL
NOTIFIED WHO: ABNORMAL
NRBC BLD AUTO-RTO: 0 /100 WBC (ref 0–0.2)
OXYHGB MFR BLDV: 94.4 % (ref 94–99)
PCO2 BLDA: 79.7 MM HG (ref 35–45)
PCO2 TEMP ADJ BLD: ABNORMAL MM[HG]
PH BLDA: 7.31 PH UNITS (ref 7.35–7.45)
PH, TEMP CORRECTED: ABNORMAL
PLATELET # BLD AUTO: 345 10*3/MM3 (ref 140–450)
PMV BLD AUTO: 9.8 FL (ref 6–12)
PO2 BLDA: 84.3 MM HG (ref 83–108)
PO2 TEMP ADJ BLD: ABNORMAL MM[HG]
POTASSIUM SERPL-SCNC: 4.4 MMOL/L (ref 3.5–5.2)
RBC # BLD AUTO: 4.4 10*6/MM3 (ref 4.14–5.8)
SAO2 % BLDCOA: 96.1 % (ref 94–99)
SODIUM SERPL-SCNC: 140 MMOL/L (ref 136–145)
VENTILATOR MODE: ABNORMAL
WBC NRBC COR # BLD AUTO: 17.71 10*3/MM3 (ref 3.4–10.8)

## 2024-09-05 PROCEDURE — 25010000002 METHYLPREDNISOLONE PER 125 MG: Performed by: STUDENT IN AN ORGANIZED HEALTH CARE EDUCATION/TRAINING PROGRAM

## 2024-09-05 PROCEDURE — 99291 CRITICAL CARE FIRST HOUR: CPT | Performed by: STUDENT IN AN ORGANIZED HEALTH CARE EDUCATION/TRAINING PROGRAM

## 2024-09-05 PROCEDURE — 36600 WITHDRAWAL OF ARTERIAL BLOOD: CPT

## 2024-09-05 PROCEDURE — 80048 BASIC METABOLIC PNL TOTAL CA: CPT | Performed by: STUDENT IN AN ORGANIZED HEALTH CARE EDUCATION/TRAINING PROGRAM

## 2024-09-05 PROCEDURE — 87449 NOS EACH ORGANISM AG IA: CPT | Performed by: STUDENT IN AN ORGANIZED HEALTH CARE EDUCATION/TRAINING PROGRAM

## 2024-09-05 PROCEDURE — 97161 PT EVAL LOW COMPLEX 20 MIN: CPT

## 2024-09-05 PROCEDURE — 99222 1ST HOSP IP/OBS MODERATE 55: CPT | Performed by: INTERNAL MEDICINE

## 2024-09-05 PROCEDURE — 87899 AGENT NOS ASSAY W/OPTIC: CPT | Performed by: STUDENT IN AN ORGANIZED HEALTH CARE EDUCATION/TRAINING PROGRAM

## 2024-09-05 PROCEDURE — 25010000002 CEFEPIME PER 500 MG: Performed by: STUDENT IN AN ORGANIZED HEALTH CARE EDUCATION/TRAINING PROGRAM

## 2024-09-05 PROCEDURE — 83050 HGB METHEMOGLOBIN QUAN: CPT

## 2024-09-05 PROCEDURE — 94799 UNLISTED PULMONARY SVC/PX: CPT

## 2024-09-05 PROCEDURE — 25010000002 ENOXAPARIN PER 10 MG: Performed by: STUDENT IN AN ORGANIZED HEALTH CARE EDUCATION/TRAINING PROGRAM

## 2024-09-05 PROCEDURE — 97165 OT EVAL LOW COMPLEX 30 MIN: CPT

## 2024-09-05 PROCEDURE — 25010000002 VANCOMYCIN 5 G RECONSTITUTED SOLUTION: Performed by: STUDENT IN AN ORGANIZED HEALTH CARE EDUCATION/TRAINING PROGRAM

## 2024-09-05 PROCEDURE — 82805 BLOOD GASES W/O2 SATURATION: CPT

## 2024-09-05 PROCEDURE — 85025 COMPLETE CBC W/AUTO DIFF WBC: CPT | Performed by: STUDENT IN AN ORGANIZED HEALTH CARE EDUCATION/TRAINING PROGRAM

## 2024-09-05 PROCEDURE — 93005 ELECTROCARDIOGRAM TRACING: CPT | Performed by: STUDENT IN AN ORGANIZED HEALTH CARE EDUCATION/TRAINING PROGRAM

## 2024-09-05 PROCEDURE — 25810000003 SODIUM CHLORIDE 0.9 % SOLUTION: Performed by: STUDENT IN AN ORGANIZED HEALTH CARE EDUCATION/TRAINING PROGRAM

## 2024-09-05 PROCEDURE — 94761 N-INVAS EAR/PLS OXIMETRY MLT: CPT

## 2024-09-05 PROCEDURE — 82375 ASSAY CARBOXYHB QUANT: CPT

## 2024-09-05 PROCEDURE — 94660 CPAP INITIATION&MGMT: CPT

## 2024-09-05 RX ORDER — BENZONATATE 100 MG/1
200 CAPSULE ORAL 3 TIMES DAILY PRN
Status: DISCONTINUED | OUTPATIENT
Start: 2024-09-05 | End: 2024-09-08 | Stop reason: HOSPADM

## 2024-09-05 RX ORDER — GUAIFENESIN/DEXTROMETHORPHAN 100-10MG/5
10 SYRUP ORAL EVERY 4 HOURS PRN
Status: DISCONTINUED | OUTPATIENT
Start: 2024-09-05 | End: 2024-09-08 | Stop reason: HOSPADM

## 2024-09-05 RX ADMIN — METHYLPREDNISOLONE SODIUM SUCCINATE 60 MG: 125 INJECTION, POWDER, FOR SOLUTION INTRAMUSCULAR; INTRAVENOUS at 10:20

## 2024-09-05 RX ADMIN — GUAIFENESIN 1200 MG: 600 TABLET ORAL at 20:01

## 2024-09-05 RX ADMIN — IPRATROPIUM BROMIDE AND ALBUTEROL SULFATE 3 ML: 2.5; .5 SOLUTION RESPIRATORY (INHALATION) at 06:45

## 2024-09-05 RX ADMIN — CEFEPIME 2000 MG: 2 INJECTION, POWDER, FOR SOLUTION INTRAVENOUS at 10:21

## 2024-09-05 RX ADMIN — DOXYCYCLINE 100 MG: 100 CAPSULE ORAL at 20:01

## 2024-09-05 RX ADMIN — Medication 10 ML: at 10:22

## 2024-09-05 RX ADMIN — CEFEPIME 2000 MG: 2 INJECTION, POWDER, FOR SOLUTION INTRAVENOUS at 00:18

## 2024-09-05 RX ADMIN — ENOXAPARIN SODIUM 40 MG: 100 INJECTION SUBCUTANEOUS at 10:21

## 2024-09-05 RX ADMIN — BUDESONIDE AND FORMOTEROL FUMARATE DIHYDRATE 2 PUFF: 160; 4.5 AEROSOL RESPIRATORY (INHALATION) at 18:36

## 2024-09-05 RX ADMIN — IPRATROPIUM BROMIDE AND ALBUTEROL SULFATE 3 ML: 2.5; .5 SOLUTION RESPIRATORY (INHALATION) at 10:09

## 2024-09-05 RX ADMIN — IPRATROPIUM BROMIDE AND ALBUTEROL SULFATE 3 ML: 2.5; .5 SOLUTION RESPIRATORY (INHALATION) at 18:36

## 2024-09-05 RX ADMIN — IPRATROPIUM BROMIDE AND ALBUTEROL SULFATE 3 ML: 2.5; .5 SOLUTION RESPIRATORY (INHALATION) at 15:27

## 2024-09-05 RX ADMIN — VANCOMYCIN HYDROCHLORIDE 1250 MG: 5 INJECTION, POWDER, LYOPHILIZED, FOR SOLUTION INTRAVENOUS at 15:08

## 2024-09-05 RX ADMIN — PANTOPRAZOLE SODIUM 40 MG: 40 TABLET, DELAYED RELEASE ORAL at 06:28

## 2024-09-05 RX ADMIN — DOXYCYCLINE 100 MG: 100 CAPSULE ORAL at 10:21

## 2024-09-05 RX ADMIN — Medication 10 ML: at 20:01

## 2024-09-05 RX ADMIN — IPRATROPIUM BROMIDE AND ALBUTEROL SULFATE 3 ML: 2.5; .5 SOLUTION RESPIRATORY (INHALATION) at 02:14

## 2024-09-05 RX ADMIN — BUDESONIDE AND FORMOTEROL FUMARATE DIHYDRATE 2 PUFF: 160; 4.5 AEROSOL RESPIRATORY (INHALATION) at 06:45

## 2024-09-05 RX ADMIN — METHYLPREDNISOLONE SODIUM SUCCINATE 60 MG: 125 INJECTION, POWDER, FOR SOLUTION INTRAMUSCULAR; INTRAVENOUS at 00:19

## 2024-09-05 RX ADMIN — GUAIFENESIN 1200 MG: 600 TABLET ORAL at 10:21

## 2024-09-05 RX ADMIN — CEFEPIME 2000 MG: 2 INJECTION, POWDER, FOR SOLUTION INTRAVENOUS at 17:37

## 2024-09-05 RX ADMIN — ASPIRIN 81 MG: 81 TABLET, COATED ORAL at 10:21

## 2024-09-05 RX ADMIN — VANCOMYCIN HYDROCHLORIDE 1250 MG: 5 INJECTION, POWDER, LYOPHILIZED, FOR SOLUTION INTRAVENOUS at 02:08

## 2024-09-05 RX ADMIN — METHYLPREDNISOLONE SODIUM SUCCINATE 60 MG: 125 INJECTION, POWDER, FOR SOLUTION INTRAMUSCULAR; INTRAVENOUS at 17:37

## 2024-09-06 LAB
ANION GAP SERPL CALCULATED.3IONS-SCNC: 7.4 MMOL/L (ref 5–15)
BACTERIA SPEC RESP CULT: ABNORMAL
BACTERIA SPEC RESP CULT: ABNORMAL
BASOPHILS # BLD AUTO: 0.03 10*3/MM3 (ref 0–0.2)
BASOPHILS NFR BLD AUTO: 0.1 % (ref 0–1.5)
BUN SERPL-MCNC: 13 MG/DL (ref 8–23)
BUN/CREAT SERPL: 20 (ref 7–25)
CALCIUM SPEC-SCNC: 8.9 MG/DL (ref 8.6–10.5)
CHLORIDE SERPL-SCNC: 100 MMOL/L (ref 98–107)
CO2 SERPL-SCNC: 33.6 MMOL/L (ref 22–29)
CREAT SERPL-MCNC: 0.65 MG/DL (ref 0.76–1.27)
DEPRECATED RDW RBC AUTO: 51.3 FL (ref 37–54)
EGFRCR SERPLBLD CKD-EPI 2021: 107.9 ML/MIN/1.73
EOSINOPHIL # BLD AUTO: 0 10*3/MM3 (ref 0–0.4)
EOSINOPHIL NFR BLD AUTO: 0 % (ref 0.3–6.2)
ERYTHROCYTE [DISTWIDTH] IN BLOOD BY AUTOMATED COUNT: 13.6 % (ref 12.3–15.4)
GLUCOSE SERPL-MCNC: 161 MG/DL (ref 65–99)
GRAM STN SPEC: ABNORMAL
GRAM STN SPEC: ABNORMAL
HCT VFR BLD AUTO: 44.3 % (ref 37.5–51)
HGB BLD-MCNC: 13.5 G/DL (ref 13–17.7)
IMM GRANULOCYTES # BLD AUTO: 0.16 10*3/MM3 (ref 0–0.05)
IMM GRANULOCYTES NFR BLD AUTO: 0.7 % (ref 0–0.5)
LYMPHOCYTES # BLD AUTO: 0.74 10*3/MM3 (ref 0.7–3.1)
LYMPHOCYTES NFR BLD AUTO: 3.4 % (ref 19.6–45.3)
MCH RBC QN AUTO: 30.8 PG (ref 26.6–33)
MCHC RBC AUTO-ENTMCNC: 30.5 G/DL (ref 31.5–35.7)
MCV RBC AUTO: 100.9 FL (ref 79–97)
MONOCYTES # BLD AUTO: 0.84 10*3/MM3 (ref 0.1–0.9)
MONOCYTES NFR BLD AUTO: 3.9 % (ref 5–12)
NEUTROPHILS NFR BLD AUTO: 19.8 10*3/MM3 (ref 1.7–7)
NEUTROPHILS NFR BLD AUTO: 91.9 % (ref 42.7–76)
NRBC BLD AUTO-RTO: 0 /100 WBC (ref 0–0.2)
PLATELET # BLD AUTO: 354 10*3/MM3 (ref 140–450)
PMV BLD AUTO: 10.5 FL (ref 6–12)
POTASSIUM SERPL-SCNC: 4 MMOL/L (ref 3.5–5.2)
RBC # BLD AUTO: 4.39 10*6/MM3 (ref 4.14–5.8)
S PNEUM AG SPEC QL LA: NEGATIVE
SODIUM SERPL-SCNC: 141 MMOL/L (ref 136–145)
WBC NRBC COR # BLD AUTO: 21.57 10*3/MM3 (ref 3.4–10.8)

## 2024-09-06 PROCEDURE — 25010000002 ENOXAPARIN PER 10 MG: Performed by: STUDENT IN AN ORGANIZED HEALTH CARE EDUCATION/TRAINING PROGRAM

## 2024-09-06 PROCEDURE — 85025 COMPLETE CBC W/AUTO DIFF WBC: CPT | Performed by: STUDENT IN AN ORGANIZED HEALTH CARE EDUCATION/TRAINING PROGRAM

## 2024-09-06 PROCEDURE — 80048 BASIC METABOLIC PNL TOTAL CA: CPT | Performed by: STUDENT IN AN ORGANIZED HEALTH CARE EDUCATION/TRAINING PROGRAM

## 2024-09-06 PROCEDURE — 25810000003 SODIUM CHLORIDE 0.9 % SOLUTION: Performed by: STUDENT IN AN ORGANIZED HEALTH CARE EDUCATION/TRAINING PROGRAM

## 2024-09-06 PROCEDURE — 94761 N-INVAS EAR/PLS OXIMETRY MLT: CPT

## 2024-09-06 PROCEDURE — 94799 UNLISTED PULMONARY SVC/PX: CPT

## 2024-09-06 PROCEDURE — 25010000002 VANCOMYCIN 5 G RECONSTITUTED SOLUTION: Performed by: STUDENT IN AN ORGANIZED HEALTH CARE EDUCATION/TRAINING PROGRAM

## 2024-09-06 PROCEDURE — 99233 SBSQ HOSP IP/OBS HIGH 50: CPT | Performed by: STUDENT IN AN ORGANIZED HEALTH CARE EDUCATION/TRAINING PROGRAM

## 2024-09-06 PROCEDURE — 25010000002 CEFEPIME PER 500 MG: Performed by: STUDENT IN AN ORGANIZED HEALTH CARE EDUCATION/TRAINING PROGRAM

## 2024-09-06 PROCEDURE — 25010000002 METHYLPREDNISOLONE PER 125 MG: Performed by: STUDENT IN AN ORGANIZED HEALTH CARE EDUCATION/TRAINING PROGRAM

## 2024-09-06 PROCEDURE — 94664 DEMO&/EVAL PT USE INHALER: CPT

## 2024-09-06 PROCEDURE — 25010000002 METHYLPREDNISOLONE PER 40 MG: Performed by: INTERNAL MEDICINE

## 2024-09-06 PROCEDURE — 99233 SBSQ HOSP IP/OBS HIGH 50: CPT | Performed by: INTERNAL MEDICINE

## 2024-09-06 RX ORDER — NICOTINE 21 MG/24HR
1 PATCH, TRANSDERMAL 24 HOURS TRANSDERMAL
Status: DISCONTINUED | OUTPATIENT
Start: 2024-09-06 | End: 2024-09-08 | Stop reason: HOSPADM

## 2024-09-06 RX ORDER — METHYLPREDNISOLONE SODIUM SUCCINATE 40 MG/ML
40 INJECTION, POWDER, LYOPHILIZED, FOR SOLUTION INTRAMUSCULAR; INTRAVENOUS EVERY 12 HOURS
Status: DISCONTINUED | OUTPATIENT
Start: 2024-09-06 | End: 2024-09-08 | Stop reason: HOSPADM

## 2024-09-06 RX ADMIN — IPRATROPIUM BROMIDE AND ALBUTEROL SULFATE 3 ML: 2.5; .5 SOLUTION RESPIRATORY (INHALATION) at 18:31

## 2024-09-06 RX ADMIN — IPRATROPIUM BROMIDE AND ALBUTEROL SULFATE 3 ML: 2.5; .5 SOLUTION RESPIRATORY (INHALATION) at 06:25

## 2024-09-06 RX ADMIN — DOXYCYCLINE 100 MG: 100 CAPSULE ORAL at 20:59

## 2024-09-06 RX ADMIN — ASPIRIN 81 MG: 81 TABLET, COATED ORAL at 09:03

## 2024-09-06 RX ADMIN — Medication 10 ML: at 20:59

## 2024-09-06 RX ADMIN — ENOXAPARIN SODIUM 40 MG: 100 INJECTION SUBCUTANEOUS at 09:03

## 2024-09-06 RX ADMIN — Medication 10 ML: at 09:05

## 2024-09-06 RX ADMIN — GUAIFENESIN 1200 MG: 600 TABLET ORAL at 20:59

## 2024-09-06 RX ADMIN — IPRATROPIUM BROMIDE AND ALBUTEROL SULFATE 3 ML: 2.5; .5 SOLUTION RESPIRATORY (INHALATION) at 10:41

## 2024-09-06 RX ADMIN — VANCOMYCIN HYDROCHLORIDE 1250 MG: 5 INJECTION, POWDER, LYOPHILIZED, FOR SOLUTION INTRAVENOUS at 02:48

## 2024-09-06 RX ADMIN — METHYLPREDNISOLONE SODIUM SUCCINATE 40 MG: 40 INJECTION, POWDER, FOR SOLUTION INTRAMUSCULAR; INTRAVENOUS at 20:59

## 2024-09-06 RX ADMIN — BUDESONIDE AND FORMOTEROL FUMARATE DIHYDRATE 2 PUFF: 160; 4.5 AEROSOL RESPIRATORY (INHALATION) at 06:25

## 2024-09-06 RX ADMIN — CEFEPIME 2000 MG: 2 INJECTION, POWDER, FOR SOLUTION INTRAVENOUS at 16:17

## 2024-09-06 RX ADMIN — IPRATROPIUM BROMIDE AND ALBUTEROL SULFATE 3 ML: 2.5; .5 SOLUTION RESPIRATORY (INHALATION) at 23:20

## 2024-09-06 RX ADMIN — BUDESONIDE AND FORMOTEROL FUMARATE DIHYDRATE 2 PUFF: 160; 4.5 AEROSOL RESPIRATORY (INHALATION) at 18:31

## 2024-09-06 RX ADMIN — GUAIFENESIN 1200 MG: 600 TABLET ORAL at 09:03

## 2024-09-06 RX ADMIN — METHYLPREDNISOLONE SODIUM SUCCINATE 60 MG: 125 INJECTION, POWDER, FOR SOLUTION INTRAMUSCULAR; INTRAVENOUS at 09:05

## 2024-09-06 RX ADMIN — CEFEPIME 2000 MG: 2 INJECTION, POWDER, FOR SOLUTION INTRAVENOUS at 00:08

## 2024-09-06 RX ADMIN — METHYLPREDNISOLONE SODIUM SUCCINATE 60 MG: 125 INJECTION, POWDER, FOR SOLUTION INTRAMUSCULAR; INTRAVENOUS at 00:08

## 2024-09-06 RX ADMIN — PANTOPRAZOLE SODIUM 40 MG: 40 TABLET, DELAYED RELEASE ORAL at 05:16

## 2024-09-06 RX ADMIN — IPRATROPIUM BROMIDE AND ALBUTEROL SULFATE 3 ML: 2.5; .5 SOLUTION RESPIRATORY (INHALATION) at 03:39

## 2024-09-06 RX ADMIN — IPRATROPIUM BROMIDE AND ALBUTEROL SULFATE 3 ML: 2.5; .5 SOLUTION RESPIRATORY (INHALATION) at 00:02

## 2024-09-06 RX ADMIN — DOXYCYCLINE 100 MG: 100 CAPSULE ORAL at 09:03

## 2024-09-06 RX ADMIN — CEFEPIME 2000 MG: 2 INJECTION, POWDER, FOR SOLUTION INTRAVENOUS at 09:05

## 2024-09-06 RX ADMIN — IPRATROPIUM BROMIDE AND ALBUTEROL SULFATE 3 ML: 2.5; .5 SOLUTION RESPIRATORY (INHALATION) at 14:30

## 2024-09-07 LAB
ANION GAP SERPL CALCULATED.3IONS-SCNC: 7.3 MMOL/L (ref 5–15)
BASOPHILS # BLD AUTO: 0.02 10*3/MM3 (ref 0–0.2)
BASOPHILS NFR BLD AUTO: 0.1 % (ref 0–1.5)
BUN SERPL-MCNC: 13 MG/DL (ref 8–23)
BUN/CREAT SERPL: 21 (ref 7–25)
CALCIUM SPEC-SCNC: 8.7 MG/DL (ref 8.6–10.5)
CHLORIDE SERPL-SCNC: 98 MMOL/L (ref 98–107)
CO2 SERPL-SCNC: 33.7 MMOL/L (ref 22–29)
CREAT SERPL-MCNC: 0.62 MG/DL (ref 0.76–1.27)
DEPRECATED RDW RBC AUTO: 50.7 FL (ref 37–54)
EGFRCR SERPLBLD CKD-EPI 2021: 109.4 ML/MIN/1.73
EOSINOPHIL # BLD AUTO: 0 10*3/MM3 (ref 0–0.4)
EOSINOPHIL NFR BLD AUTO: 0 % (ref 0.3–6.2)
ERYTHROCYTE [DISTWIDTH] IN BLOOD BY AUTOMATED COUNT: 13.7 % (ref 12.3–15.4)
GLUCOSE SERPL-MCNC: 158 MG/DL (ref 65–99)
HCT VFR BLD AUTO: 44.4 % (ref 37.5–51)
HGB BLD-MCNC: 13.5 G/DL (ref 13–17.7)
IMM GRANULOCYTES # BLD AUTO: 0.1 10*3/MM3 (ref 0–0.05)
IMM GRANULOCYTES NFR BLD AUTO: 0.6 % (ref 0–0.5)
LYMPHOCYTES # BLD AUTO: 0.73 10*3/MM3 (ref 0.7–3.1)
LYMPHOCYTES NFR BLD AUTO: 4.1 % (ref 19.6–45.3)
MCH RBC QN AUTO: 30.5 PG (ref 26.6–33)
MCHC RBC AUTO-ENTMCNC: 30.4 G/DL (ref 31.5–35.7)
MCV RBC AUTO: 100.5 FL (ref 79–97)
MONOCYTES # BLD AUTO: 0.6 10*3/MM3 (ref 0.1–0.9)
MONOCYTES NFR BLD AUTO: 3.4 % (ref 5–12)
NEUTROPHILS NFR BLD AUTO: 16.39 10*3/MM3 (ref 1.7–7)
NEUTROPHILS NFR BLD AUTO: 91.8 % (ref 42.7–76)
NRBC BLD AUTO-RTO: 0 /100 WBC (ref 0–0.2)
PLATELET # BLD AUTO: 376 10*3/MM3 (ref 140–450)
PMV BLD AUTO: 9.8 FL (ref 6–12)
POTASSIUM SERPL-SCNC: 4.2 MMOL/L (ref 3.5–5.2)
RBC # BLD AUTO: 4.42 10*6/MM3 (ref 4.14–5.8)
SODIUM SERPL-SCNC: 139 MMOL/L (ref 136–145)
WBC NRBC COR # BLD AUTO: 17.84 10*3/MM3 (ref 3.4–10.8)

## 2024-09-07 PROCEDURE — 94664 DEMO&/EVAL PT USE INHALER: CPT

## 2024-09-07 PROCEDURE — 99233 SBSQ HOSP IP/OBS HIGH 50: CPT | Performed by: INTERNAL MEDICINE

## 2024-09-07 PROCEDURE — 94761 N-INVAS EAR/PLS OXIMETRY MLT: CPT

## 2024-09-07 PROCEDURE — 99233 SBSQ HOSP IP/OBS HIGH 50: CPT | Performed by: STUDENT IN AN ORGANIZED HEALTH CARE EDUCATION/TRAINING PROGRAM

## 2024-09-07 PROCEDURE — 85025 COMPLETE CBC W/AUTO DIFF WBC: CPT | Performed by: STUDENT IN AN ORGANIZED HEALTH CARE EDUCATION/TRAINING PROGRAM

## 2024-09-07 PROCEDURE — 80048 BASIC METABOLIC PNL TOTAL CA: CPT | Performed by: STUDENT IN AN ORGANIZED HEALTH CARE EDUCATION/TRAINING PROGRAM

## 2024-09-07 PROCEDURE — 94660 CPAP INITIATION&MGMT: CPT

## 2024-09-07 PROCEDURE — 94799 UNLISTED PULMONARY SVC/PX: CPT

## 2024-09-07 PROCEDURE — 25010000002 CEFEPIME PER 500 MG: Performed by: STUDENT IN AN ORGANIZED HEALTH CARE EDUCATION/TRAINING PROGRAM

## 2024-09-07 PROCEDURE — 25010000002 ENOXAPARIN PER 10 MG: Performed by: STUDENT IN AN ORGANIZED HEALTH CARE EDUCATION/TRAINING PROGRAM

## 2024-09-07 PROCEDURE — 25010000002 METHYLPREDNISOLONE PER 40 MG: Performed by: INTERNAL MEDICINE

## 2024-09-07 RX ORDER — IPRATROPIUM BROMIDE AND ALBUTEROL SULFATE 2.5; .5 MG/3ML; MG/3ML
3 SOLUTION RESPIRATORY (INHALATION)
Status: DISCONTINUED | OUTPATIENT
Start: 2024-09-07 | End: 2024-09-08 | Stop reason: HOSPADM

## 2024-09-07 RX ADMIN — GUAIFENESIN 1200 MG: 600 TABLET ORAL at 08:14

## 2024-09-07 RX ADMIN — ASPIRIN 81 MG: 81 TABLET, COATED ORAL at 08:14

## 2024-09-07 RX ADMIN — Medication 10 ML: at 21:02

## 2024-09-07 RX ADMIN — CEFEPIME 2000 MG: 2 INJECTION, POWDER, FOR SOLUTION INTRAVENOUS at 00:50

## 2024-09-07 RX ADMIN — CEFEPIME 2000 MG: 2 INJECTION, POWDER, FOR SOLUTION INTRAVENOUS at 08:13

## 2024-09-07 RX ADMIN — METHYLPREDNISOLONE SODIUM SUCCINATE 40 MG: 40 INJECTION, POWDER, FOR SOLUTION INTRAMUSCULAR; INTRAVENOUS at 21:02

## 2024-09-07 RX ADMIN — CEFEPIME 2000 MG: 2 INJECTION, POWDER, FOR SOLUTION INTRAVENOUS at 15:52

## 2024-09-07 RX ADMIN — ENOXAPARIN SODIUM 40 MG: 100 INJECTION SUBCUTANEOUS at 08:14

## 2024-09-07 RX ADMIN — Medication 10 ML: at 08:15

## 2024-09-07 RX ADMIN — GUAIFENESIN 1200 MG: 600 TABLET ORAL at 21:02

## 2024-09-07 RX ADMIN — METHYLPREDNISOLONE SODIUM SUCCINATE 40 MG: 40 INJECTION, POWDER, FOR SOLUTION INTRAMUSCULAR; INTRAVENOUS at 08:14

## 2024-09-07 RX ADMIN — BUDESONIDE AND FORMOTEROL FUMARATE DIHYDRATE 2 PUFF: 160; 4.5 AEROSOL RESPIRATORY (INHALATION) at 06:25

## 2024-09-07 RX ADMIN — DOXYCYCLINE 100 MG: 100 CAPSULE ORAL at 08:14

## 2024-09-07 RX ADMIN — IPRATROPIUM BROMIDE AND ALBUTEROL SULFATE 3 ML: 2.5; .5 SOLUTION RESPIRATORY (INHALATION) at 06:25

## 2024-09-07 RX ADMIN — DOXYCYCLINE 100 MG: 100 CAPSULE ORAL at 21:02

## 2024-09-07 RX ADMIN — IPRATROPIUM BROMIDE AND ALBUTEROL SULFATE 3 ML: 2.5; .5 SOLUTION RESPIRATORY (INHALATION) at 03:32

## 2024-09-07 RX ADMIN — PANTOPRAZOLE SODIUM 40 MG: 40 TABLET, DELAYED RELEASE ORAL at 08:14

## 2024-09-07 RX ADMIN — BUDESONIDE AND FORMOTEROL FUMARATE DIHYDRATE 2 PUFF: 160; 4.5 AEROSOL RESPIRATORY (INHALATION) at 18:50

## 2024-09-07 RX ADMIN — IPRATROPIUM BROMIDE AND ALBUTEROL SULFATE 3 ML: 2.5; .5 SOLUTION RESPIRATORY (INHALATION) at 18:50

## 2024-09-07 RX ADMIN — IPRATROPIUM BROMIDE AND ALBUTEROL SULFATE 3 ML: 2.5; .5 SOLUTION RESPIRATORY (INHALATION) at 12:54

## 2024-09-08 ENCOUNTER — READMISSION MANAGEMENT (OUTPATIENT)
Dept: CALL CENTER | Facility: HOSPITAL | Age: 59
End: 2024-09-08
Payer: MEDICARE

## 2024-09-08 VITALS
OXYGEN SATURATION: 92 % | RESPIRATION RATE: 18 BRPM | HEART RATE: 80 BPM | DIASTOLIC BLOOD PRESSURE: 83 MMHG | HEIGHT: 73 IN | WEIGHT: 192.24 LBS | BODY MASS INDEX: 25.48 KG/M2 | TEMPERATURE: 98.1 F | SYSTOLIC BLOOD PRESSURE: 121 MMHG

## 2024-09-08 PROCEDURE — 94761 N-INVAS EAR/PLS OXIMETRY MLT: CPT

## 2024-09-08 PROCEDURE — 94799 UNLISTED PULMONARY SVC/PX: CPT

## 2024-09-08 PROCEDURE — 25010000002 ENOXAPARIN PER 10 MG: Performed by: STUDENT IN AN ORGANIZED HEALTH CARE EDUCATION/TRAINING PROGRAM

## 2024-09-08 PROCEDURE — 94660 CPAP INITIATION&MGMT: CPT

## 2024-09-08 PROCEDURE — 99233 SBSQ HOSP IP/OBS HIGH 50: CPT | Performed by: INTERNAL MEDICINE

## 2024-09-08 PROCEDURE — 25010000002 CEFEPIME PER 500 MG: Performed by: STUDENT IN AN ORGANIZED HEALTH CARE EDUCATION/TRAINING PROGRAM

## 2024-09-08 PROCEDURE — 25010000002 METHYLPREDNISOLONE PER 40 MG: Performed by: INTERNAL MEDICINE

## 2024-09-08 PROCEDURE — 99239 HOSP IP/OBS DSCHRG MGMT >30: CPT | Performed by: INTERNAL MEDICINE

## 2024-09-08 PROCEDURE — 94664 DEMO&/EVAL PT USE INHALER: CPT

## 2024-09-08 RX ORDER — IPRATROPIUM BROMIDE AND ALBUTEROL SULFATE 2.5; .5 MG/3ML; MG/3ML
SOLUTION RESPIRATORY (INHALATION)
Qty: 18 ML | Refills: 0 | Status: SHIPPED | OUTPATIENT
Start: 2024-09-08

## 2024-09-08 RX ORDER — GUAIFENESIN 600 MG/1
1200 TABLET, EXTENDED RELEASE ORAL EVERY 12 HOURS SCHEDULED
Qty: 20 TABLET | Refills: 0 | Status: SHIPPED | OUTPATIENT
Start: 2024-09-08 | End: 2024-09-13

## 2024-09-08 RX ORDER — CEFDINIR 300 MG/1
300 CAPSULE ORAL 2 TIMES DAILY
Qty: 10 CAPSULE | Refills: 0 | Status: SHIPPED | OUTPATIENT
Start: 2024-09-08 | End: 2024-09-13

## 2024-09-08 RX ORDER — PREDNISONE 20 MG/1
40 TABLET ORAL DAILY
Qty: 10 TABLET | Refills: 0 | Status: SHIPPED | OUTPATIENT
Start: 2024-09-08 | End: 2024-09-13

## 2024-09-08 RX ORDER — DOXYCYCLINE 100 MG/1
CAPSULE ORAL
Qty: 10 CAPSULE | Refills: 0 | Status: SHIPPED | OUTPATIENT
Start: 2024-09-08 | End: 2024-09-11

## 2024-09-08 RX ORDER — DOXYCYCLINE 100 MG/1
100 CAPSULE ORAL EVERY 12 HOURS SCHEDULED
Qty: 10 CAPSULE | Refills: 0 | Status: SHIPPED | OUTPATIENT
Start: 2024-09-08 | End: 2024-09-13

## 2024-09-08 RX ORDER — PREDNISONE 20 MG/1
40 TABLET ORAL DAILY
Qty: 10 TABLET | Refills: 0 | Status: SHIPPED | OUTPATIENT
Start: 2024-09-08 | End: 2024-09-11

## 2024-09-08 RX ADMIN — PANTOPRAZOLE SODIUM 40 MG: 40 TABLET, DELAYED RELEASE ORAL at 06:26

## 2024-09-08 RX ADMIN — ENOXAPARIN SODIUM 40 MG: 100 INJECTION SUBCUTANEOUS at 08:26

## 2024-09-08 RX ADMIN — Medication 10 ML: at 08:27

## 2024-09-08 RX ADMIN — IPRATROPIUM BROMIDE AND ALBUTEROL SULFATE 3 ML: 2.5; .5 SOLUTION RESPIRATORY (INHALATION) at 06:29

## 2024-09-08 RX ADMIN — GUAIFENESIN 1200 MG: 600 TABLET ORAL at 08:26

## 2024-09-08 RX ADMIN — IPRATROPIUM BROMIDE AND ALBUTEROL SULFATE 3 ML: 2.5; .5 SOLUTION RESPIRATORY (INHALATION) at 00:44

## 2024-09-08 RX ADMIN — BUDESONIDE AND FORMOTEROL FUMARATE DIHYDRATE 2 PUFF: 160; 4.5 AEROSOL RESPIRATORY (INHALATION) at 06:29

## 2024-09-08 RX ADMIN — ASPIRIN 81 MG: 81 TABLET, COATED ORAL at 08:26

## 2024-09-08 RX ADMIN — DOXYCYCLINE 100 MG: 100 CAPSULE ORAL at 08:26

## 2024-09-08 RX ADMIN — CEFEPIME 2000 MG: 2 INJECTION, POWDER, FOR SOLUTION INTRAVENOUS at 08:26

## 2024-09-08 RX ADMIN — METHYLPREDNISOLONE SODIUM SUCCINATE 40 MG: 40 INJECTION, POWDER, FOR SOLUTION INTRAMUSCULAR; INTRAVENOUS at 08:27

## 2024-09-08 RX ADMIN — CEFEPIME 2000 MG: 2 INJECTION, POWDER, FOR SOLUTION INTRAVENOUS at 00:18

## 2024-09-09 ENCOUNTER — TELEPHONE (OUTPATIENT)
Dept: TELEMETRY | Facility: HOSPITAL | Age: 59
End: 2024-09-09
Payer: MEDICARE

## 2024-09-09 LAB
BACTERIA SPEC AEROBE CULT: NORMAL
BACTERIA SPEC AEROBE CULT: NORMAL
QT INTERVAL: 450 MS
QTC INTERVAL: 495 MS

## 2024-09-11 ENCOUNTER — READMISSION MANAGEMENT (OUTPATIENT)
Dept: CALL CENTER | Facility: HOSPITAL | Age: 59
End: 2024-09-11
Payer: MEDICARE

## 2024-09-11 ENCOUNTER — OFFICE VISIT (OUTPATIENT)
Dept: FAMILY MEDICINE CLINIC | Facility: CLINIC | Age: 59
End: 2024-09-11
Payer: MEDICARE

## 2024-09-11 VITALS
BODY MASS INDEX: 24.6 KG/M2 | OXYGEN SATURATION: 90 % | DIASTOLIC BLOOD PRESSURE: 78 MMHG | HEART RATE: 102 BPM | WEIGHT: 185.6 LBS | SYSTOLIC BLOOD PRESSURE: 116 MMHG | TEMPERATURE: 96.8 F | HEIGHT: 73 IN

## 2024-09-11 DIAGNOSIS — R39.14 BENIGN PROSTATIC HYPERPLASIA WITH INCOMPLETE BLADDER EMPTYING: ICD-10-CM

## 2024-09-11 DIAGNOSIS — J15.7 PNEUMONIA OF BOTH UPPER LOBES DUE TO MYCOPLASMA PNEUMONIAE: ICD-10-CM

## 2024-09-11 DIAGNOSIS — N40.1 BENIGN PROSTATIC HYPERPLASIA WITH INCOMPLETE BLADDER EMPTYING: ICD-10-CM

## 2024-09-11 DIAGNOSIS — R91.8 PULMONARY NODULES: Primary | ICD-10-CM

## 2024-09-11 DIAGNOSIS — J44.1 COPD WITH EXACERBATION: ICD-10-CM

## 2024-09-11 DIAGNOSIS — Z87.891 PERSONAL HISTORY OF TOBACCO USE, PRESENTING HAZARDS TO HEALTH: ICD-10-CM

## 2024-09-11 PROCEDURE — 1125F AMNT PAIN NOTED PAIN PRSNT: CPT | Performed by: STUDENT IN AN ORGANIZED HEALTH CARE EDUCATION/TRAINING PROGRAM

## 2024-09-11 PROCEDURE — 1111F DSCHRG MED/CURRENT MED MERGE: CPT | Performed by: STUDENT IN AN ORGANIZED HEALTH CARE EDUCATION/TRAINING PROGRAM

## 2024-09-11 PROCEDURE — 99495 TRANSJ CARE MGMT MOD F2F 14D: CPT | Performed by: STUDENT IN AN ORGANIZED HEALTH CARE EDUCATION/TRAINING PROGRAM

## 2024-09-11 RX ORDER — TAMSULOSIN HYDROCHLORIDE 0.4 MG/1
1 CAPSULE ORAL DAILY
Qty: 30 CAPSULE | Refills: 2 | Status: SHIPPED | OUTPATIENT
Start: 2024-09-11

## 2024-09-11 RX ORDER — FLUTICASONE FUROATE, UMECLIDINIUM BROMIDE AND VILANTEROL TRIFENATATE 200; 62.5; 25 UG/1; UG/1; UG/1
1 POWDER RESPIRATORY (INHALATION) DAILY
Qty: 60 EACH | Refills: 5 | Status: SHIPPED | OUTPATIENT
Start: 2024-09-11

## 2024-09-11 RX ORDER — VARENICLINE TARTRATE 0.5 (11)-1
1 KIT ORAL TAKE AS DIRECTED
Qty: 53 EACH | Refills: 0 | Status: SHIPPED | OUTPATIENT
Start: 2024-09-11

## 2024-09-12 ENCOUNTER — PATIENT ROUNDING (BHMG ONLY) (OUTPATIENT)
Dept: FAMILY MEDICINE CLINIC | Facility: CLINIC | Age: 59
End: 2024-09-12
Payer: MEDICARE

## 2024-09-17 ENCOUNTER — HOSPITAL ENCOUNTER (OUTPATIENT)
Dept: PULMONOLOGY | Facility: HOSPITAL | Age: 59
Discharge: HOME | End: 2024-09-17
Payer: MEDICARE

## 2024-09-17 VITALS
DIASTOLIC BLOOD PRESSURE: 93 MMHG | BODY MASS INDEX: 24.78 KG/M2 | SYSTOLIC BLOOD PRESSURE: 139 MMHG | WEIGHT: 187.8 LBS | HEART RATE: 95 BPM | OXYGEN SATURATION: 89 %

## 2024-09-17 DIAGNOSIS — J44.1 COPD WITH EXACERBATION: ICD-10-CM

## 2024-09-17 DIAGNOSIS — F17.210 CIGARETTE NICOTINE DEPENDENCE WITHOUT COMPLICATION: ICD-10-CM

## 2024-09-17 DIAGNOSIS — J44.9 STAGE 3 SEVERE COPD BY GOLD CLASSIFICATION: ICD-10-CM

## 2024-09-17 DIAGNOSIS — J96.11 CHRONIC RESPIRATORY FAILURE WITH HYPOXIA: ICD-10-CM

## 2024-09-17 DIAGNOSIS — J44.9 CHRONIC OBSTRUCTIVE PULMONARY DISEASE, UNSPECIFIED COPD TYPE: Primary | ICD-10-CM

## 2024-09-17 DIAGNOSIS — R91.1 PULMONARY NODULE: ICD-10-CM

## 2024-09-17 DIAGNOSIS — I27.20 PULMONARY HYPERTENSION: ICD-10-CM

## 2024-09-17 PROCEDURE — 94010 BREATHING CAPACITY TEST: CPT

## 2024-09-17 PROCEDURE — 94618 PULMONARY STRESS TESTING: CPT | Performed by: PHYSICIAN ASSISTANT

## 2024-09-17 RX ORDER — PREDNISONE 20 MG/1
20 TABLET ORAL 2 TIMES DAILY
COMMUNITY
End: 2024-09-19

## 2024-09-17 RX ORDER — IPRATROPIUM BROMIDE AND ALBUTEROL SULFATE 2.5; .5 MG/3ML; MG/3ML
3 SOLUTION RESPIRATORY (INHALATION) EVERY 4 HOURS PRN
Qty: 360 ML | Refills: 3 | Status: SHIPPED | OUTPATIENT
Start: 2024-09-17

## 2024-09-17 RX ORDER — NICOTINE 21 MG/24HR
1 PATCH, TRANSDERMAL 24 HOURS TRANSDERMAL EVERY 24 HOURS
Qty: 28 PATCH | Refills: 1 | Status: SHIPPED | OUTPATIENT
Start: 2024-09-17

## 2024-09-17 RX ORDER — NICOTINE 21 MG/24HR
1 PATCH, TRANSDERMAL 24 HOURS TRANSDERMAL
Status: DISCONTINUED | OUTPATIENT
Start: 2024-09-17 | End: 2024-09-17

## 2024-09-17 RX ORDER — FLUTICASONE FUROATE, UMECLIDINIUM BROMIDE AND VILANTEROL TRIFENATATE 200; 62.5; 25 UG/1; UG/1; UG/1
1 POWDER RESPIRATORY (INHALATION) DAILY
Qty: 180 EACH | Refills: 1 | Status: SHIPPED | OUTPATIENT
Start: 2024-09-17

## 2024-09-17 RX ORDER — METHOCARBAMOL 750 MG/1
750 TABLET, FILM COATED ORAL AS NEEDED
COMMUNITY

## 2024-09-17 RX ORDER — POLYETHYLENE GLYCOL 3350 17 G
4 POWDER IN PACKET (EA) ORAL
Status: DISCONTINUED | OUTPATIENT
Start: 2024-09-17 | End: 2024-09-17

## 2024-09-20 ENCOUNTER — READMISSION MANAGEMENT (OUTPATIENT)
Dept: CALL CENTER | Facility: HOSPITAL | Age: 59
End: 2024-09-20
Payer: MEDICARE

## 2024-09-25 ENCOUNTER — LAB (OUTPATIENT)
Dept: FAMILY MEDICINE CLINIC | Facility: CLINIC | Age: 59
End: 2024-09-25
Payer: MEDICARE

## 2024-09-25 ENCOUNTER — OFFICE VISIT (OUTPATIENT)
Dept: FAMILY MEDICINE CLINIC | Facility: CLINIC | Age: 59
End: 2024-09-25
Payer: MEDICARE

## 2024-09-25 VITALS
WEIGHT: 189.2 LBS | RESPIRATION RATE: 16 BRPM | SYSTOLIC BLOOD PRESSURE: 120 MMHG | BODY MASS INDEX: 25.08 KG/M2 | DIASTOLIC BLOOD PRESSURE: 70 MMHG | HEART RATE: 97 BPM | HEIGHT: 73 IN | OXYGEN SATURATION: 97 % | TEMPERATURE: 97.7 F

## 2024-09-25 DIAGNOSIS — R73.9 HYPERGLYCEMIA: Primary | ICD-10-CM

## 2024-09-25 DIAGNOSIS — J44.1 COPD EXACERBATION: ICD-10-CM

## 2024-09-25 DIAGNOSIS — I25.10 CORONARY ARTERY DISEASE INVOLVING NATIVE CORONARY ARTERY OF NATIVE HEART WITHOUT ANGINA PECTORIS: ICD-10-CM

## 2024-09-25 LAB
EXPIRATION DATE: NORMAL
FLUAV AG UPPER RESP QL IA.RAPID: NOT DETECTED
FLUBV AG UPPER RESP QL IA.RAPID: NOT DETECTED
INTERNAL CONTROL: NORMAL
Lab: NORMAL
SARS-COV-2 AG UPPER RESP QL IA.RAPID: NOT DETECTED

## 2024-09-25 PROCEDURE — 99214 OFFICE O/P EST MOD 30 MIN: CPT | Performed by: STUDENT IN AN ORGANIZED HEALTH CARE EDUCATION/TRAINING PROGRAM

## 2024-09-25 PROCEDURE — 80061 LIPID PANEL: CPT | Performed by: STUDENT IN AN ORGANIZED HEALTH CARE EDUCATION/TRAINING PROGRAM

## 2024-09-25 PROCEDURE — 36415 COLL VENOUS BLD VENIPUNCTURE: CPT | Performed by: STUDENT IN AN ORGANIZED HEALTH CARE EDUCATION/TRAINING PROGRAM

## 2024-09-25 PROCEDURE — 1170F FXNL STATUS ASSESSED: CPT | Performed by: STUDENT IN AN ORGANIZED HEALTH CARE EDUCATION/TRAINING PROGRAM

## 2024-09-25 PROCEDURE — 1125F AMNT PAIN NOTED PAIN PRSNT: CPT | Performed by: STUDENT IN AN ORGANIZED HEALTH CARE EDUCATION/TRAINING PROGRAM

## 2024-09-25 PROCEDURE — 85027 COMPLETE CBC AUTOMATED: CPT | Performed by: STUDENT IN AN ORGANIZED HEALTH CARE EDUCATION/TRAINING PROGRAM

## 2024-09-25 PROCEDURE — G0439 PPPS, SUBSEQ VISIT: HCPCS | Performed by: STUDENT IN AN ORGANIZED HEALTH CARE EDUCATION/TRAINING PROGRAM

## 2024-09-25 PROCEDURE — 80053 COMPREHEN METABOLIC PANEL: CPT | Performed by: STUDENT IN AN ORGANIZED HEALTH CARE EDUCATION/TRAINING PROGRAM

## 2024-09-25 PROCEDURE — 87428 SARSCOV & INF VIR A&B AG IA: CPT | Performed by: STUDENT IN AN ORGANIZED HEALTH CARE EDUCATION/TRAINING PROGRAM

## 2024-09-25 PROCEDURE — 83036 HEMOGLOBIN GLYCOSYLATED A1C: CPT | Performed by: STUDENT IN AN ORGANIZED HEALTH CARE EDUCATION/TRAINING PROGRAM

## 2024-09-25 RX ORDER — DOXYCYCLINE 100 MG/1
100 CAPSULE ORAL 2 TIMES DAILY
Qty: 20 CAPSULE | Refills: 0 | Status: SHIPPED | OUTPATIENT
Start: 2024-09-25

## 2024-09-25 RX ORDER — PREDNISONE 20 MG/1
20 TABLET ORAL DAILY
Qty: 10 TABLET | Refills: 0 | Status: SHIPPED | OUTPATIENT
Start: 2024-09-25

## 2024-09-26 LAB
ALBUMIN SERPL-MCNC: 3.8 G/DL (ref 3.5–5.2)
ALBUMIN/GLOB SERPL: 1.4 G/DL
ALP SERPL-CCNC: 76 U/L (ref 39–117)
ALT SERPL W P-5'-P-CCNC: 10 U/L (ref 1–41)
ANION GAP SERPL CALCULATED.3IONS-SCNC: 9.3 MMOL/L (ref 5–15)
AST SERPL-CCNC: 11 U/L (ref 1–40)
BILIRUB SERPL-MCNC: 0.3 MG/DL (ref 0–1.2)
BUN SERPL-MCNC: 7 MG/DL (ref 8–23)
BUN/CREAT SERPL: 9.6 (ref 7–25)
CALCIUM SPEC-SCNC: 9.4 MG/DL (ref 8.6–10.5)
CHLORIDE SERPL-SCNC: 100 MMOL/L (ref 98–107)
CHOLEST SERPL-MCNC: 196 MG/DL (ref 0–200)
CO2 SERPL-SCNC: 29.7 MMOL/L (ref 22–29)
CREAT SERPL-MCNC: 0.73 MG/DL (ref 0.76–1.27)
DEPRECATED RDW RBC AUTO: 43.8 FL (ref 37–54)
EGFRCR SERPLBLD CKD-EPI 2021: 104.2 ML/MIN/1.73
ERYTHROCYTE [DISTWIDTH] IN BLOOD BY AUTOMATED COUNT: 12.6 % (ref 12.3–15.4)
GLOBULIN UR ELPH-MCNC: 2.7 GM/DL
GLUCOSE SERPL-MCNC: 96 MG/DL (ref 65–99)
HBA1C MFR BLD: 5.6 % (ref 4.8–5.6)
HCT VFR BLD AUTO: 45.3 % (ref 37.5–51)
HDLC SERPL-MCNC: 45 MG/DL (ref 40–60)
HGB BLD-MCNC: 15 G/DL (ref 13–17.7)
LDLC SERPL CALC-MCNC: 135 MG/DL (ref 0–100)
LDLC/HDLC SERPL: 2.96 {RATIO}
MCH RBC QN AUTO: 31.2 PG (ref 26.6–33)
MCHC RBC AUTO-ENTMCNC: 33.1 G/DL (ref 31.5–35.7)
MCV RBC AUTO: 94.2 FL (ref 79–97)
PLATELET # BLD AUTO: 227 10*3/MM3 (ref 140–450)
PMV BLD AUTO: 11.4 FL (ref 6–12)
POTASSIUM SERPL-SCNC: 3.7 MMOL/L (ref 3.5–5.2)
PROT SERPL-MCNC: 6.5 G/DL (ref 6–8.5)
RBC # BLD AUTO: 4.81 10*6/MM3 (ref 4.14–5.8)
SODIUM SERPL-SCNC: 139 MMOL/L (ref 136–145)
TRIGL SERPL-MCNC: 90 MG/DL (ref 0–150)
VLDLC SERPL-MCNC: 16 MG/DL (ref 5–40)
WBC NRBC COR # BLD AUTO: 9.04 10*3/MM3 (ref 3.4–10.8)

## 2024-09-27 ENCOUNTER — SPECIALTY PHARMACY (OUTPATIENT)
Dept: PHARMACY | Facility: HOSPITAL | Age: 59
End: 2024-09-27
Payer: MEDICARE

## 2024-09-27 ENCOUNTER — DOCUMENTATION (OUTPATIENT)
Dept: PULMONOLOGY | Facility: CLINIC | Age: 59
End: 2024-09-27
Payer: MEDICARE

## 2024-10-01 ENCOUNTER — TELEPHONE (OUTPATIENT)
Dept: FAMILY MEDICINE CLINIC | Facility: CLINIC | Age: 59
End: 2024-10-01
Payer: MEDICARE

## 2024-10-23 ENCOUNTER — HOSPITAL ENCOUNTER (OUTPATIENT)
Dept: PULMONOLOGY | Facility: HOSPITAL | Age: 59
Discharge: HOME | End: 2024-10-23
Admitting: PHYSICIAN ASSISTANT
Payer: MEDICARE

## 2024-10-23 ENCOUNTER — OFFICE VISIT (OUTPATIENT)
Dept: FAMILY MEDICINE CLINIC | Facility: CLINIC | Age: 59
End: 2024-10-23
Payer: MEDICARE

## 2024-10-23 VITALS
SYSTOLIC BLOOD PRESSURE: 116 MMHG | HEART RATE: 88 BPM | BODY MASS INDEX: 25.34 KG/M2 | TEMPERATURE: 97.6 F | WEIGHT: 191.2 LBS | HEIGHT: 73 IN | OXYGEN SATURATION: 91 % | DIASTOLIC BLOOD PRESSURE: 78 MMHG

## 2024-10-23 VITALS
SYSTOLIC BLOOD PRESSURE: 143 MMHG | WEIGHT: 190.6 LBS | OXYGEN SATURATION: 93 % | HEART RATE: 90 BPM | BODY MASS INDEX: 25.15 KG/M2 | DIASTOLIC BLOOD PRESSURE: 76 MMHG

## 2024-10-23 DIAGNOSIS — J44.9 STAGE 4 VERY SEVERE COPD BY GOLD CLASSIFICATION: ICD-10-CM

## 2024-10-23 DIAGNOSIS — R91.8 PULMONARY NODULES: Primary | ICD-10-CM

## 2024-10-23 DIAGNOSIS — R39.14 BENIGN PROSTATIC HYPERPLASIA WITH INCOMPLETE BLADDER EMPTYING: ICD-10-CM

## 2024-10-23 DIAGNOSIS — N40.1 BENIGN PROSTATIC HYPERPLASIA WITH INCOMPLETE BLADDER EMPTYING: ICD-10-CM

## 2024-10-23 DIAGNOSIS — J44.9 STAGE 3 SEVERE COPD BY GOLD CLASSIFICATION: Primary | ICD-10-CM

## 2024-10-23 DIAGNOSIS — J96.11 CHRONIC RESPIRATORY FAILURE WITH HYPOXIA: ICD-10-CM

## 2024-10-23 DIAGNOSIS — I27.20 PULMONARY HYPERTENSION: ICD-10-CM

## 2024-10-23 DIAGNOSIS — F17.210 CIGARETTE NICOTINE DEPENDENCE WITHOUT COMPLICATION: ICD-10-CM

## 2024-10-23 DIAGNOSIS — R91.1 PULMONARY NODULE: ICD-10-CM

## 2024-10-23 PROCEDURE — 99214 OFFICE O/P EST MOD 30 MIN: CPT | Performed by: STUDENT IN AN ORGANIZED HEALTH CARE EDUCATION/TRAINING PROGRAM

## 2024-10-23 PROCEDURE — 1125F AMNT PAIN NOTED PAIN PRSNT: CPT | Performed by: STUDENT IN AN ORGANIZED HEALTH CARE EDUCATION/TRAINING PROGRAM

## 2024-10-23 PROCEDURE — G0463 HOSPITAL OUTPT CLINIC VISIT: HCPCS | Performed by: PHYSICIAN ASSISTANT

## 2024-10-23 RX ORDER — NICOTINE 21 MG/24HR
1 PATCH, TRANSDERMAL 24 HOURS TRANSDERMAL EVERY 24 HOURS
Qty: 21 EACH | Refills: 1 | Status: SHIPPED | OUTPATIENT
Start: 2024-10-23

## 2024-10-23 RX ORDER — NICOTINE 21 MG/24HR
1 PATCH, TRANSDERMAL 24 HOURS TRANSDERMAL EVERY 24 HOURS
Qty: 14 EACH | Refills: 1 | Status: SHIPPED | OUTPATIENT
Start: 2024-10-23

## 2024-10-24 ENCOUNTER — HOSPITAL ENCOUNTER (OUTPATIENT)
Dept: PET IMAGING | Facility: HOSPITAL | Age: 59
Discharge: HOME | End: 2024-10-24
Admitting: STUDENT IN AN ORGANIZED HEALTH CARE EDUCATION/TRAINING PROGRAM
Payer: MEDICARE

## 2024-10-24 PROCEDURE — A9552 F18 FDG: HCPCS | Performed by: STUDENT IN AN ORGANIZED HEALTH CARE EDUCATION/TRAINING PROGRAM

## 2024-10-24 PROCEDURE — 78815 PET IMAGE W/CT SKULL-THIGH: CPT

## 2024-10-24 PROCEDURE — 0 FLUDEOXYGLUCOSE F18 SOLUTION: Performed by: STUDENT IN AN ORGANIZED HEALTH CARE EDUCATION/TRAINING PROGRAM

## 2024-10-24 RX ADMIN — FLUDEOXYGLUCOSE F 18 1 DOSE: 200 INJECTION, SOLUTION INTRAVENOUS at 12:39

## 2024-10-28 ENCOUNTER — TELEPHONE (OUTPATIENT)
Dept: FAMILY MEDICINE CLINIC | Facility: CLINIC | Age: 59
End: 2024-10-28
Payer: MEDICARE

## 2024-10-28 NOTE — TELEPHONE ENCOUNTER
----- Message from Fran Lopez sent at 10/28/2024  9:48 AM EDT -----  Please let the patient know that his lung nodule is concerning for malignancy and likely needs biopsied. I have referred him to our lung nodule specialist.

## 2024-10-31 ENCOUNTER — PATIENT OUTREACH (OUTPATIENT)
Dept: PULMONOLOGY | Facility: CLINIC | Age: 59
End: 2024-10-31
Payer: MEDICARE

## 2024-10-31 NOTE — SIGNIFICANT NOTE
NN called to patient on this date. Patient has been referred to lung nodule clinic with Dr. Holman due to incidental lung nodule found on CT chest imaging, followed up with a suspicious PET result, showing there is a risk of malignancy. Communicated to patient that referral was made to lung nodule clinic, but based off the imaging results, and it was recommended to get him in asap with pulmonology (due to next lung nodule clinic being scheduled for 11/25). Patient stated that he was aware of the PET and CT results, but that he was not very concerned due to having had abnormal chest imaging in the past at other hospital systems. NN offered to facilitate a faster clinic appointment with another pulmonologist.  NN also offered a next available appointment for patient with [facility] on 12/2, with hopes of a cancellation on 11/25, the appointment could be moved up. Patient declined a quicker appointment, and has opted for the 12/2 appointment with [facility]. Meanwhile, patient agreed for NN to work with patient's previous PCP office to try to obtain previous imaging reports & discs to be uploaded in patient's chart. NN contact information was provided and emphasized for patient to call with any concerns, or if he were to change his mind for wanting a quicker appointment/intervention. Patient verbalized understanding and is in agreement with his plan of care.

## 2024-12-05 ENCOUNTER — PATIENT OUTREACH (OUTPATIENT)
Dept: PULMONOLOGY | Facility: CLINIC | Age: 59
End: 2024-12-05
Payer: MEDICARE

## 2024-12-05 NOTE — SIGNIFICANT NOTE
Patient missed pulmonology consult with Dr. Holman on Monday, 12/2/24. Multiple attempts made by phone to contact patient with no answer, and unable to leave voicemail message. Called to patient's daughter/emergency contact, and obtained new phone number for patient. Called to new number, and spoke with patient. Rescheduled patient with [facility] on Mon, 12/9 at 4:00pm in the [facility] lung nodule clinic.

## 2024-12-09 ENCOUNTER — OFFICE VISIT (OUTPATIENT)
Dept: PULMONOLOGY | Facility: CLINIC | Age: 59
End: 2024-12-09
Payer: MEDICARE

## 2024-12-09 VITALS
HEART RATE: 97 BPM | DIASTOLIC BLOOD PRESSURE: 84 MMHG | HEIGHT: 73 IN | BODY MASS INDEX: 25.18 KG/M2 | SYSTOLIC BLOOD PRESSURE: 130 MMHG | WEIGHT: 190 LBS | TEMPERATURE: 97.5 F | OXYGEN SATURATION: 87 %

## 2024-12-09 DIAGNOSIS — R91.8 MULTIPLE PULMONARY NODULES: Primary | ICD-10-CM

## 2024-12-09 DIAGNOSIS — Z72.0 TOBACCO ABUSE: ICD-10-CM

## 2024-12-09 DIAGNOSIS — J96.12 CHRONIC RESPIRATORY FAILURE WITH HYPOXIA AND HYPERCAPNIA: ICD-10-CM

## 2024-12-09 DIAGNOSIS — J44.9 COPD MIXED TYPE: ICD-10-CM

## 2024-12-09 DIAGNOSIS — J96.11 CHRONIC RESPIRATORY FAILURE WITH HYPOXIA AND HYPERCAPNIA: ICD-10-CM

## 2024-12-09 DIAGNOSIS — I27.20 PULMONARY HYPERTENSION: ICD-10-CM

## 2024-12-09 PROCEDURE — 99215 OFFICE O/P EST HI 40 MIN: CPT | Performed by: INTERNAL MEDICINE

## 2024-12-09 PROCEDURE — 1160F RVW MEDS BY RX/DR IN RCRD: CPT | Performed by: INTERNAL MEDICINE

## 2024-12-09 PROCEDURE — 1159F MED LIST DOCD IN RCRD: CPT | Performed by: INTERNAL MEDICINE

## 2024-12-09 NOTE — PROGRESS NOTES
PULMONARY  NOTE    Chief Complaint     Chronic hypoxic and hypercarbic respiratory failure, stage IV COPD, abnormal CT scan of the chest, ongoing tobacco abuse    History of Present Illness     59-year-old male referred for abnormal chest imaging  Recently seen in the hospital by the pulmonary service when he was admitted for an exacerbation of COPD and acute on chronic respiratory failure    He has ongoing tobacco abuse  Currently no plans for smoking cessation    He has had abnormal chest imaging  Previously underwent a bronchoscopy by Dr. Belcher at [facility] in 2017 for a pulmonary nodule  No malignancy was identified at that time    Recent chest imaging revealed a new approximately 10 mm right upper lobe pulmonary nodule  A PET scan was ordered as an outpatient which revealed abnormal hypermetabolic activity in the right upper lobe nodule  Has been referred to our office    Remains on inhalers which he indicates helps with the shortness of breath    Noninvasive positive pressure ventilation was discussed during his recent hospital stay but not using as an outpatient    Patient Active Problem List   Diagnosis    Stage IV (Very severe) emphysema    Tobacco abuse    Multiple pulmonary nodules (New RUL hypermetabolic 10mm)    Chronic respiratory failure with hypoxia and hypercapnia    Pulmonary hypertension (Mod-severe) - Probably WHO Group 3      Allergies   Allergen Reactions    Ciprofloxacin Itching       Current Outpatient Medications:     albuterol sulfate  (90 Base) MCG/ACT inhaler, Inhale 2 puffs Every 6 (Six) Hours As Needed for Wheezing or Shortness of Air., Disp: , Rfl:     aspirin 81 MG EC tablet, Take 1 tablet by mouth Daily., Disp: , Rfl:     Fluticasone-Umeclidin-Vilant (Trelegy Ellipta) 200-62.5-25 MCG/ACT inhaler, Inhale 1 puff by mouth daily., Disp: 180 each, Rfl: 1    gabapentin (NEURONTIN) 300 MG capsule, Take 1 capsule by mouth 3 (Three) Times a Day As Needed (Nerve Pain)., Disp: , Rfl:      "HYDROcodone-acetaminophen (NORCO)  MG per tablet, Take 1.5 tablets by mouth Daily As Needed for Mild Pain or Moderate Pain., Disp: , Rfl:     ipratropium-albuterol (DUO-NEB) 0.5-2.5 mg/3 ml nebulizer, Take 3 mL by nebulization Every 4 (Four) Hours As Needed for Wheezing or Shortness of Air., Disp: 360 mL, Rfl: 3    methocarbamol (ROBAXIN) 750 MG tablet, Take 1 tablet by mouth As Needed for Muscle Spasms., Disp: , Rfl:     tamsulosin (FLOMAX) 0.4 MG capsule 24 hr capsule, Take 1 capsule by mouth Daily., Disp: 30 capsule, Rfl: 2    nicotine (NICODERM CQ) 14 MG/24HR patch, Place 1 patch on the skin as directed by provider Daily. (Patient not taking: Reported on 12/9/2024), Disp: 14 each, Rfl: 1    nicotine (NICODERM CQ) 21 MG/24HR patch, Place 1 patch on the skin as directed by provider Daily. (Patient not taking: Reported on 12/9/2024), Disp: 21 each, Rfl: 1    nicotine (NICODERM CQ) 7 MG/24HR patch, Place 1 patch on the skin as directed by provider Daily. (Patient not taking: Reported on 12/9/2024), Disp: 14 each, Rfl: 1  MEDICATION LIST AND ALLERGIES REVIEWED.    Family History   Problem Relation Age of Onset    Hyperlipidemia Father     Cancer Sister     Cancer Brother      Social History     Tobacco Use    Smoking status: Every Day     Current packs/day: 1.00     Average packs/day: 1 pack/day for 46.9 years (46.9 ttl pk-yrs)     Types: Cigarettes     Start date: 1978     Passive exposure: Current    Smokeless tobacco: Never   Vaping Use    Vaping status: Never Used   Substance Use Topics    Alcohol use: No    Drug use: No     Social History     Social History Narrative    Not on file     FAMILY AND SOCIAL HISTORY REVIEWED.    Review of Systems  IF PRESENT REFER TO SCANNED ROS SHEET FROM SAME DATE  OTHERWISE ROS OBTAINED AND NON-CONTRIBUTORY OVER HPI.    /84   Pulse 97   Temp 97.5 °F (36.4 °C)   Ht 185.4 cm (73\")   Wt 86.2 kg (190 lb)   SpO2 (!) 87%   BMI 25.07 kg/m²   Physical Exam  Vitals and " nursing note reviewed.   Constitutional:       General: He is not in acute distress.     Appearance: He is well-developed. He is not diaphoretic.   HENT:      Head: Normocephalic and atraumatic.   Neck:      Thyroid: No thyromegaly.   Cardiovascular:      Rate and Rhythm: Normal rate and regular rhythm.      Heart sounds: Normal heart sounds. No murmur heard.  Pulmonary:      Effort: Pulmonary effort is normal.      Breath sounds: No stridor.      Comments: Decreased breath sounds bilaterally  Lymphadenopathy:      Cervical: No cervical adenopathy.      Upper Body:      Right upper body: No supraclavicular or epitrochlear adenopathy.      Left upper body: No supraclavicular or epitrochlear adenopathy.   Skin:     General: Skin is warm and dry.   Neurological:      Mental Status: He is alert and oriented to person, place, and time.   Psychiatric:         Behavior: Behavior normal.         Results     CT scans of the chest reviewed on PACS  My interpretation as noted below    PFTs reveal very severe airway obstruction  FEV1 34%    Arterial blood gases from his recent hospital stay revealed mostly compensated hypercarbic respiratory failure with a PaCO2 in the 60s    Immunization History   Administered Date(s) Administered    Fluzone (or Fluarix & Flulaval for VFC) >6mos 10/19/2015, 10/15/2018    Tetanus Toxoid 09/18/2009     Problem List       ICD-10-CM ICD-9-CM   1. Multiple pulmonary nodules (New RUL hypermetabolic 10mm)  R91.8 793.19   2. Chronic respiratory failure with hypoxia and hypercapnia  J96.11 518.83    J96.12 799.02     786.09   3. Stage IV (Very severe) emphysema  J44.9 496   4. Tobacco abuse  Z72.0 305.1   5. Pulmonary hypertension (Mod-severe) - Probably WHO Group 3  I27.20 416.8       Discussion     We reviewed his chest imaging together on PACS  He has diffuse emphysematous disease  He has multiple parenchymal nodules  He has a new approximately 10 mm nodule in the right upper lobe that does exhibit  some abnormal hypermetabolic activity on PET scan    Previously noted nodules are grossly stable and do not exhibit abnormal hypermetabolic activity    He has very severe, stage IV, chronic obstructive pulmonary disease  This is due to his long history of tobacco abuse  Prior alpha-1 antitrypsin screen was normal, MM    He has chronic mixed hypoxic and hypercarbic respiratory failure  He has been prescribed noninvasive positive pressure ventilation after his hospital stay but is not using it    Unfortunately, he represents an extremely high risk for any type of biopsy procedure or general anesthesia due to the severity of his lung disease, pulmonary hypertension and chronic hypercarbic respiratory failure    At this point, we are going to get a short interval, 3-month, follow-up CT scan of the chest in January 2025  If the right upper lobe pulmonary nodule shows interval enlargement then at that point would consider referral to radiation oncology for empiric SBRT.    Currently no plans for smoking cessation    Level of service justified based on 45 minutes spent in patient care on this date of service including, but not limited to: preparing to see the patient, obtaining and/or reviewing history, performing medically appropriate examination, ordering tests/medicine/procedures, independently interpreting results, documenting clinical information in EHR, and counseling/education of patient/family/caregiver (excluding time spent on other separate services such as performing procedures or test interpretation, if applicable). (Level 4 30-39 minutes; Level 5 40-54 minutes)    Jayson Holman MD  Note electronically signed    CC: Fran Lopez DO

## 2024-12-11 ENCOUNTER — OFFICE VISIT (OUTPATIENT)
Dept: SURGERY | Facility: CLINIC | Age: 59
End: 2024-12-11
Payer: MEDICARE

## 2024-12-11 VITALS
HEIGHT: 73 IN | SYSTOLIC BLOOD PRESSURE: 120 MMHG | BODY MASS INDEX: 25.98 KG/M2 | WEIGHT: 196 LBS | DIASTOLIC BLOOD PRESSURE: 80 MMHG

## 2024-12-11 DIAGNOSIS — K40.90 LEFT INGUINAL HERNIA: ICD-10-CM

## 2024-12-11 DIAGNOSIS — J96.11 CHRONIC RESPIRATORY FAILURE WITH HYPOXIA AND HYPERCAPNIA: ICD-10-CM

## 2024-12-11 DIAGNOSIS — J96.12 CHRONIC RESPIRATORY FAILURE WITH HYPOXIA AND HYPERCAPNIA: ICD-10-CM

## 2024-12-11 DIAGNOSIS — I27.20 PULMONARY HYPERTENSION: Primary | ICD-10-CM

## 2024-12-11 DIAGNOSIS — Z72.0 TOBACCO ABUSE: ICD-10-CM

## 2024-12-11 PROCEDURE — 99203 OFFICE O/P NEW LOW 30 MIN: CPT | Performed by: SURGERY

## 2024-12-11 PROCEDURE — 1160F RVW MEDS BY RX/DR IN RCRD: CPT | Performed by: SURGERY

## 2024-12-11 PROCEDURE — 1159F MED LIST DOCD IN RCRD: CPT | Performed by: SURGERY

## 2024-12-11 NOTE — PROGRESS NOTES
Date of Service: 12/11/2024    Subjective   Rich Calvillo is a 59 y.o. male is being seen for consultation for left inguinal hernia today at the request of Fran Lopez DO    Chief complaint: Left groin bulge    Rich Calvillo is a 59 y.o.  male smoker with stage IV COPD who presents to my clinic with 2 to 3 weeks of symptomatic left groin bulge first noted after lifting.  This is always reducible.  Denies obstructive symptoms    The patient has stage IV COPD.  He is currently undergoing workup for hypermetabolic right upper lobe nodule that is high risk for biopsy due to general anesthesia, per pulmonology.  The patient reports a prior hernia repair but he is unsure where the hernia was.  This was performed by Dr. Harding  Smoked as much as 2 packs/day.  But now smokes 1 pack/day    Past Medical History:   Diagnosis Date    Cancer     prostate    COPD (chronic obstructive pulmonary disease)     Hyperlipemia     Wears glasses        Past Surgical History:   Procedure Laterality Date    CARDIAC CATHETERIZATION      CYSTOSCOPY      HERNIA REPAIR Right     INGUINAL    KNEE ACL RECONSTRUCTION Right     PROSTATE BIOPSY           Family History   Problem Relation Age of Onset    Hyperlipidemia Father     Cancer Sister     Cancer Brother          Social History     Socioeconomic History    Marital status: Single   Tobacco Use    Smoking status: Every Day     Current packs/day: 1.00     Average packs/day: 1 pack/day for 46.9 years (46.9 ttl pk-yrs)     Types: Cigarettes     Start date: 1978     Passive exposure: Current    Smokeless tobacco: Never   Vaping Use    Vaping status: Never Used   Substance and Sexual Activity    Alcohol use: Yes    Drug use: No    Sexual activity: Defer                Review of Systems     14 pt ROS negative except for what is noted in HPI        Objective     Physical Exam:      12/11/24  1015   Weight: 88.9 kg (196 lb)    Body mass index is 25.86 kg/m².  Constitution: /80   Ht  "185.4 cm (72.99\")   Wt 88.9 kg (196 lb)   BMI 25.86 kg/m²  . No acute distress   Head: Normocephalic, atraumatic.   Eyes: Aligned without strabismus. Conjunctivae noninjected   Ears, Nose, Mouth: , no lesions appreciated   Respiratory: Symmetric chest expansion. No respiratory distress.   Gastrointestinal:  Soft, nontender, nondistended.  Very small reducible umbilical hernia.  Reducible left inguinal hernia  Skin:  No cyanosis, clubbing or edema bilaterally    Lymphatics: No abnormal cervical or supraclavicular adenopathy  Neurologic: No gross deficits.  Alert and oriented x3  Psychiatric: Normal mood        CT chest with severe emphysema.    Echo with moderate to severe pulmonary hypertension      Rich Calvillo is a 59 y.o.  male smoker, history of stage IV COPD and moderate to severe pulmonary hypertension, with a symptomatic left inguinal hernia    We discussed that the patient is high risk due to his comorbid conditions.  He would need to at least quit smoking for 4 to 6 weeks prior to procedure.  This would minimize his increased risk of perioperative wound infection, mesh infection and hernia recurrence due to tobacco abuse and COPD.  However, his moderate to severe pulmonary hypertension, if it does not improve, would pose a high risk under general anesthesia.  He will continue his smoking cessation efforts and follow-up in 2 months  We discussed referral to smoking cessation pharmacist but he is not currently amenable                     This document has been electronically signed by Pan Lacey MD   December 11, 2024 10:30 [facility]  "

## 2024-12-17 ENCOUNTER — HOSPITAL ENCOUNTER (OUTPATIENT)
Dept: PULMONOLOGY | Facility: HOSPITAL | Age: 59
Discharge: HOME | End: 2024-12-17
Admitting: PHYSICIAN ASSISTANT
Payer: MEDICARE

## 2024-12-17 VITALS
SYSTOLIC BLOOD PRESSURE: 132 MMHG | OXYGEN SATURATION: 91 % | WEIGHT: 193 LBS | HEART RATE: 90 BPM | BODY MASS INDEX: 25.47 KG/M2 | DIASTOLIC BLOOD PRESSURE: 78 MMHG

## 2024-12-17 DIAGNOSIS — J96.11 CHRONIC RESPIRATORY FAILURE WITH HYPOXIA AND HYPERCAPNIA: ICD-10-CM

## 2024-12-17 DIAGNOSIS — F17.210 CIGARETTE NICOTINE DEPENDENCE WITHOUT COMPLICATION: ICD-10-CM

## 2024-12-17 DIAGNOSIS — R91.8 MULTIPLE PULMONARY NODULES: ICD-10-CM

## 2024-12-17 DIAGNOSIS — J44.9 CHRONIC OBSTRUCTIVE PULMONARY DISEASE, UNSPECIFIED COPD TYPE: Primary | ICD-10-CM

## 2024-12-17 DIAGNOSIS — J96.12 CHRONIC RESPIRATORY FAILURE WITH HYPOXIA AND HYPERCAPNIA: ICD-10-CM

## 2024-12-17 DIAGNOSIS — I27.20 PULMONARY HYPERTENSION: ICD-10-CM

## 2024-12-17 PROCEDURE — G0463 HOSPITAL OUTPT CLINIC VISIT: HCPCS | Performed by: PHYSICIAN ASSISTANT

## 2024-12-17 RX ORDER — GUAIFENESIN 600 MG/1
600 TABLET, EXTENDED RELEASE ORAL 2 TIMES DAILY PRN
Qty: 20 TABLET | Refills: 3 | Status: SHIPPED | OUTPATIENT
Start: 2024-12-17

## 2024-12-17 RX ORDER — PREDNISONE 20 MG/1
40 TABLET ORAL DAILY
Qty: 10 TABLET | Refills: 1 | Status: SHIPPED | OUTPATIENT
Start: 2024-12-17 | End: 2024-12-22

## 2024-12-17 RX ORDER — DOXYCYCLINE 100 MG/1
100 CAPSULE ORAL 2 TIMES DAILY
Qty: 10 CAPSULE | Refills: 1 | Status: SHIPPED | OUTPATIENT
Start: 2024-12-17 | End: 2024-12-22

## 2024-12-17 RX ORDER — ALBUTEROL SULFATE 90 UG/1
2 INHALANT RESPIRATORY (INHALATION) EVERY 6 HOURS PRN
Qty: 18 G | Refills: 8 | Status: SHIPPED | OUTPATIENT
Start: 2024-12-17

## 2024-12-17 NOTE — PROGRESS NOTES
[facility]     COPD Clinic    Patient Name: Rich Calvillo  : 1965  MRN: [id number]  Primary Care Physician:  Fran Lopez DO      Subjective   Subjective     Chief Complaint: COPD    History of Present Illness    Mr. Braun presents today for follow up.   Notable for recent flare up, completed his rescue pack a noted symptom benefit from use.   Continues with Trelegy use and feels that this provides benefit.     Smoking history notable for approximately 47 years of use with 1 pack per day average. 47 pack year history noted.   Remains a current smoker.   Tried to obtain patches previously but did not fill the prescription in time. Has noted that he had more ongong stress during which time he smoked more. Since that has improved, he states he is now smoking around half a pack per day.     Notable for pulmonary nodules.   Recently completed follow up [facility] Dr. Holman.   Notable fr past bronchoscopy with Dr. Belcher at [facility] in 2017 for a pulmonary nodule. No malignancy noted at that time.   Recent CT imaging showed a new approximately 10 mm right upper lobe pulmonary nodule. PET completed, which showed abnormal hypermetabolic activity of the RUL nodule. Due to high risk procedural concerns, he was recommended for 3 month imaging follow up.          Objective   Objective     Vitals:   Vitals:    24 1031   BP: 132/78   Pulse: 90   SpO2: 91%         Physical Exam  Vitals reviewed.   Constitutional:       General: He is not in acute distress.     Appearance: He is not diaphoretic.   HENT:      Head: Normocephalic and atraumatic.   Cardiovascular:      Rate and Rhythm: Normal rate and regular rhythm.   Pulmonary:      Effort: Pulmonary effort is normal.      Breath sounds: No wheezing, rhonchi or rales.   Neurological:      Mental Status: He is alert and oriented to person, place, and time.   Psychiatric:         Behavior: Behavior normal.          Result Review    Result Review:  I have  personally reviewed the results from the time of this admission to 12/17/2024 18:00 EST and agree with these findings:  [x]  Laboratory list / accordion  []  Microbiology  [x]  Radiology  []  EKG/Telemetry   [x]  Cardiology/Vascular   []  Pathology  [x]  Old records  [x]  Other:  Most notable findings include:       Alpha 1 genotype normal, MM      -----------------------------------------------------------------------------------    CT chest/report September 2024.    IMPRESSION:  1.  Centrilobular nodular opacities with interstitial type opacities of  the right greater than left mid and lower lung zones most consistent  with atypical pneumonia.  2.  Interval development of pulmonary nodule right upper lobe, image  #34, that is 10.1 mm.  3.  Advanced emphysema again noted.  4.  14.3 mm nodule left lower lung, image #91, along the major fissure,  is new from the previous exam.  5.  Mild mediastinal adenopathy is noted with borderline enlarged hilar  lymph nodes. A left AP window node is 2.5 cm.  6.  Assessment of the lobar, segmental, and subsegmental pulmonary  arteries essentially nondiagnostic due to diminished bolus concentration  and patient motion during image acquisition. No filling defect  identified of the pulmonary arterial trunk or main pulmonary arteries.  7.  Cardiomegaly with moderate coronary artery calcifications.  8. No effusion or pneumothorax.  9. Outpatient follow-up with PET/CT for further assessment of new  bilateral pulmonary nodules.        This report was finalized on 9/4/2024 1:18 PM by Dr. Ashwin Newman MD.      Personal review,  - Tiny nodules on image 32 (R), 102 (L) stable since 2017.      -----------------------------------------------------------------------------------    PET/CT imaging/report October 2024          Per personal review  - 10 mm nodule and 14 mm left nodule remain stable in size.  - Other bilateral lower lobe nodular opacities  resolved.      -----------------------------------------------------------------------------------    Echocardiogram       Left ventricular systolic function is normal. Left ventricular ejection fraction appears to be 56 - 60%.    Left ventricular wall thickness is consistent with mild concentric hypertrophy.    The right ventricular cavity is mild to moderately dilated with normal systolic function.    The right atrial cavity is mild to moderately dilated in limited views.    Estimated right ventricular systolic pressure from tricuspid regurgitation is moderately elevated (45-55 mmHg).    Assuming right atrial pressure of >15 mmHg (Dilated IVC at 2.5 cm with no respiratory variation) moderate to severe pulmonary hypertension (PASP 65 mmHg) is present.    There is no evidence of pericardial effusion.            Assessment & Plan   Assessment / Plan      Diagnosis Plan   1. Chronic obstructive pulmonary disease, unspecified COPD type        2. Multiple pulmonary nodules (New RUL hypermetabolic 10mm)      other new but non-hypermetabolic 14 mm left nodule.      3. Cigarette nicotine dependence without complication        4. Chronic respiratory failure with hypoxia and hypercapnia        5. Pulmonary hypertension (Mod-severe) - Probably WHO Group 3              Plan:     COPD:   Alpha 1 genotype normal  Extensive emphysema noted on CT imaging.  Continue albuterol inhaler as needed  Continue DuoNebs as needed  Continue Trelegy Ellipta 50 mcg daily  Recently use COPD exacerbation course for flareup, which did result in improvement of symptoms.  Returned to baseline at this time.  Refilled exacerbation kit with 5-day course doxycycline and 5-day course oral prednisone  Remains current smoker.        Chronic hypoxic and hypercapnic respiratory failure:   Has supplemental oxygen supplies for both ambulatory and home use. Would appreciate a POC for ambulation, which has been ordered but had not heard from the DME company  about this.   Called DME - will try to resolve issue so POC can be obtained.   Continue O2 during the daytime - can titrate to room air while sitting, but O2 recommended with exertion.   Saturation was at 91% on room air today.   Recommend continued use at nighttime, 2 L  Self returned NIPPV, was not using the device.         Pulmonary nodules  Imaging notable for multiple small pulmonary nodules in 2017.  On most recent imaging in 2024, notable for new 10 mm noncalcified right upper lobe nodule, and new left noncalcified 14 mm nodule.   Also notable for some new density/nodular opacities at the bilateral bases.  Underwent PET/CT imaging, per report:.  -.Right upper lobe 10 mm nodule was metabolic.  - Left 14 mm nodule was not hypermetabolic  He completed follow-up with Dr. Holman recently.  Possible etiologies discussed again today.  He has had procedural risk due to severity of emphysema.  Due to concern of the new nodule(s), was recommended for 3-month follow-up imaging (currently scheduled for January 2025)  If concern increases per follow-up CT imaging, Dr. Holman recommended consideration of SRBT rather than procedural consideration.  If stable, can continue with follow-up imaging.        Pulmonary hypertension:   Noted on recent echo. Likely group 3 in the setting of extensive emphysema/respiratory failure.         Cigarette dependence:   Smoking history notable for approximately 47 years of use with 1 pack per day average. 47 pack year history noted.   Remains a current smoker.   Remains a current smoker but has reduced down to 1/2 pack per day.         Return in about 3 months (around 3/17/2025), or if symptoms worsen or fail to improve, for Recheck.    Donna Neal PA-C

## 2024-12-17 NOTE — PROGRESS NOTES
"COPD Education    NAME:___Rich Calvillo  :_1965_  APPOINTMENT DATE/TIME:___________24___10:30 EDT___________    COPD Education Evaluation            COPD Education Completed (See Note) Yes     COPD Clinic encounter: 3rd    Referring/consulting Provider: Dr. Bender     Reason for Consultation:  COPD Exacerbation   COPD Diagnosis Length 20 years     Current Outpatient Pulmonologist     NO           Subjective            Spirometry      Spirometry Completed YES     FVC 70%     Fev1 34%     Fev1/FVC 38     GOLD Stage 3           Classification of Airflow Limitation Severity in COPD (Based on Post-Bronchodilator FEV1)           Gold 1: Mild FEV1 >= 80% predicted      Gold 2:  Moderate 50% <= FEV1 < 80% predicted   Gold 3: Severe 30% >= FEV1 < 50% predicted   Gold 4: Very Severe FEV1 < 30% predicted            Dyspnea:        Do you have Dyspnea? Yes and With Exertion.       DME Equipment Used:              Home O2? YES     Home O2 device? Home O2 Concentrator  Portable O2 Tanks     Nebulizer YES     NIPPV None      Objective:        Barriers to Learning? No    Discussed:             COPD education given via the booklet \"A Patient's Guide to COPD\".     COPD Zones: (Green/yellow/Red): YES     Exacerbation or Flare up signs and symptoms: YES       COPD Medication Non-Compliance YES       Managing Medications: YES     Patient understands use of Rescue Medications: YES     Type of Rescue inhaler patient currently has: Albuterol and Duoneb nebulizer solution   Patient understands use of Maintenance Medications: YES     Type of Maintenance inhaler patient currently has: ICS/LABA/LAMA and (Trelegy 200 mcg)    Proper MDI technique (w/wo Spacer): YES and Patient understands proper inhaler technique for his Trelegy and rescue MDI. He states he has been using his SPACER with his MDI and feels he is getting more benefit of his Rescue inhlaer.     Provided patient a Spacer: YES and During previous visit.       Smoking " Cessation information offered to patient: YES and He was unable to get his Nicotine patches after his last visit but states he would like to get them now. SHYANN Thompson to order .     Nicotine Replacement Therapy Discussed YES     Breathing Techniques:            Purse-lipped breathing YES     Oxygen therapy SAFETY:  YES     COVID Vaccination Status:       Discussed COPD Rescue Kit:  Yes and Discussed use of COPD rescue kit, how to use the kit, when to use the kit (yellow zone) and why the kit may beneficial. He did use his COPD rescue kit in the last 3 weeks. He stated he was having a mild exacerbation and his COPD rescue kit saved him from going to the ER.          Action Plan            Aerobika for sputum mobilization: NO and Aerobika during a previous visit.     Rescue regimen ordered: YES and Albuterol     COPD Maintenance Inhaler ordered: NO     COPD/Lung Health Clinic follow up scheduled: YES and 3/18/2025     Education Minutes with patient: YES and 30 minutes       Goals Discussed with patient: Become smoke free., Take medications as ordered., GO to Dr. appointments., Increase activity., Eat healthier., Increase use of pursed lip breathing., Decrease flare-ups., and Increase COPD Knowledge.    Comments:     Mr. Calvillo presents to the COPD Clinic for a follow up visit. He was discharged from the hospital on 9/8/2024 and referred to the COPD Clinic by Dr. Bender. He currently has home and portable oxygen. He uses Trelegy 200 mcg for maintenance. He has Albuterol MDI and Duoneb nebulizer solution for rescue. He states most of the time he only uses his Albuterol MDI and is doing well. Mr. Calvillo is using home oxygen at night and PRN during the daytime.    During Previous visits, he completed:    -Spirometry: results shown above  -6MWT: He completed the walk test, walking a total of 315 meters. His lowest SPO2 was 77% during the walk test. He did increase back to 90%  post test on room air while recovering.    -Reviewed his Alpha 1 Genotype result: M/M,  -Began COPD Education    Today, we:    -continued discussing the COPD educational topics listed above.   -SHYANN Thompson, ordered nicotine patches for him.     When he returns, we will:    -review his adherence to the NRT,  -Continue discussing COPD Education    CRIS Cardenas, RRT  COPD Navigator  12/17/24

## 2024-12-17 NOTE — PROGRESS NOTES
[facility]  COPD Management     Rich Calvillo is a 59 y.o. male seen in the [facility] for COPD. The patient's current COPD regimen includes: Trelegy: 1 puff QD, PRN Albuterol HFA and PRN Duo-Nebs.  Pt reports fair adherence to maintenance regimen. Pt reports he feels well on the current regimen. He reports he uses his albuterol HFA 2-3 times a day and uses his nebs up to 2-3 times a day, but sometimes he doesn't use nebulizer at all.    Rich Calvillo reports he smokes 1.5 ppd and has smoked for 40 years. He smokes the first cigarette of the day as soon as he wakes up.  He is interested in quitting. He had his PCP send nicotine patches to [facility], but he reports he never picked them up. He requests for us to resend them and he will pick them up within the next couple of days.         Past Medical History:   Diagnosis Date    Cancer     prostate    COPD (chronic obstructive pulmonary disease)     Hyperlipemia     Wears glasses      Social History     Socioeconomic History    Marital status: Single   Tobacco Use    Smoking status: Every Day     Current packs/day: 1.00     Average packs/day: 1 pack/day for 47.0 years (47.0 ttl pk-yrs)     Types: Cigarettes     Start date: 1978     Passive exposure: Current    Smokeless tobacco: Never   Vaping Use    Vaping status: Never Used   Substance and Sexual Activity    Alcohol use: Yes    Drug use: No    Sexual activity: Defer     Ciprofloxacin    Current Outpatient Medications:     albuterol sulfate  (90 Base) MCG/ACT inhaler, Inhale 2 puffs Every 6 (Six) Hours As Needed for Wheezing or Shortness of Air., Disp: , Rfl:     aspirin 81 MG EC tablet, Take 1 tablet by mouth Daily., Disp: , Rfl:     Fluticasone-Umeclidin-Vilant (Trelegy Ellipta) 200-62.5-25 MCG/ACT inhaler, Inhale 1 puff by mouth daily., Disp: 180 each, Rfl: 1    gabapentin (NEURONTIN) 300 MG capsule, Take 1 capsule by mouth 3 (Three) Times a Day As Needed (Nerve  Pain)., Disp: , Rfl:     HYDROcodone-acetaminophen (NORCO)  MG per tablet, Take 1.5 tablets by mouth Daily As Needed for Mild Pain or Moderate Pain., Disp: , Rfl:     ipratropium-albuterol (DUO-NEB) 0.5-2.5 mg/3 ml nebulizer, Take 3 mL by nebulization Every 4 (Four) Hours As Needed for Wheezing or Shortness of Air., Disp: 360 mL, Rfl: 3    methocarbamol (ROBAXIN) 750 MG tablet, Take 1 tablet by mouth As Needed for Muscle Spasms., Disp: , Rfl:     nicotine (NICODERM CQ) 14 MG/24HR patch, Place 1 patch on the skin as directed by provider Daily., Disp: 14 each, Rfl: 1    nicotine (NICODERM CQ) 21 MG/24HR patch, Place 1 patch on the skin as directed by provider Daily., Disp: 21 each, Rfl: 1    nicotine (NICODERM CQ) 7 MG/24HR patch, Place 1 patch on the skin as directed by provider Daily., Disp: 14 each, Rfl: 1    tamsulosin (FLOMAX) 0.4 MG capsule 24 hr capsule, Take 1 capsule by mouth Daily., Disp: 30 capsule, Rfl: 2      Vaccination Status     Influenza: Declines, says he had a bad experience after getting   Pneumococcal: Declines  Zoster: Declines    Drug-Drug Interactions: None    Drug-Disease Interactions (non-cardioselective beta blockers): None    Patient Assistance: None at this time    Inhaler Technique Observed? No    Treatment Goals: Risk Reduction and Symptom Control     Medication Assessment & Plan:     He will continue Trelegy 1 puff daily.  Also, prescribed course of prednisone, doxycycline, and guaifenesin.   PRN Albuterol HFA was refilled.  Advised Pt on the importance of continuing maintenance inhaler regimen and the importance of rinsing mouth after ICS use.  Counseled Pt on the importance of smoking cessation. Sent nicotine patches to [facility] for patient to  within next couple of day.   Recommended vaccination: flu & pneumonia, he declines today.     Thank you for allowing me to participate in the care of your patient,    Donna Gan [facility]  12/17/2024  10:28 EST

## 2025-01-23 ENCOUNTER — LAB (OUTPATIENT)
Dept: FAMILY MEDICINE CLINIC | Facility: CLINIC | Age: 60
End: 2025-01-23
Payer: MEDICARE

## 2025-01-23 ENCOUNTER — OFFICE VISIT (OUTPATIENT)
Dept: FAMILY MEDICINE CLINIC | Facility: CLINIC | Age: 60
End: 2025-01-23
Payer: MEDICARE

## 2025-01-23 VITALS
DIASTOLIC BLOOD PRESSURE: 64 MMHG | BODY MASS INDEX: 25.37 KG/M2 | TEMPERATURE: 97.3 F | HEIGHT: 73 IN | OXYGEN SATURATION: 89 % | WEIGHT: 191.4 LBS | SYSTOLIC BLOOD PRESSURE: 96 MMHG | HEART RATE: 90 BPM

## 2025-01-23 DIAGNOSIS — J44.9 STAGE 4 VERY SEVERE COPD BY GOLD CLASSIFICATION: ICD-10-CM

## 2025-01-23 DIAGNOSIS — R39.14 BENIGN PROSTATIC HYPERPLASIA WITH INCOMPLETE BLADDER EMPTYING: Primary | ICD-10-CM

## 2025-01-23 DIAGNOSIS — N40.1 BENIGN PROSTATIC HYPERPLASIA WITH INCOMPLETE BLADDER EMPTYING: Primary | ICD-10-CM

## 2025-01-23 DIAGNOSIS — R91.8 PULMONARY NODULES: ICD-10-CM

## 2025-01-23 DIAGNOSIS — I25.10 CORONARY ARTERY DISEASE INVOLVING NATIVE CORONARY ARTERY OF NATIVE HEART WITHOUT ANGINA PECTORIS: ICD-10-CM

## 2025-01-23 LAB
ALBUMIN SERPL-MCNC: 3.7 G/DL (ref 3.5–5.2)
ALBUMIN/GLOB SERPL: 1.2 G/DL
ALP SERPL-CCNC: 86 U/L (ref 39–117)
ALT SERPL W P-5'-P-CCNC: 13 U/L (ref 1–41)
ANION GAP SERPL CALCULATED.3IONS-SCNC: 7.1 MMOL/L (ref 5–15)
AST SERPL-CCNC: 14 U/L (ref 1–40)
BILIRUB SERPL-MCNC: 0.3 MG/DL (ref 0–1.2)
BUN SERPL-MCNC: 8 MG/DL (ref 8–23)
BUN/CREAT SERPL: 9.3 (ref 7–25)
CALCIUM SPEC-SCNC: 9.5 MG/DL (ref 8.6–10.5)
CHLORIDE SERPL-SCNC: 100 MMOL/L (ref 98–107)
CO2 SERPL-SCNC: 31.9 MMOL/L (ref 22–29)
CREAT SERPL-MCNC: 0.86 MG/DL (ref 0.76–1.27)
DEPRECATED RDW RBC AUTO: 42.3 FL (ref 37–54)
EGFRCR SERPLBLD CKD-EPI 2021: 99.1 ML/MIN/1.73
ERYTHROCYTE [DISTWIDTH] IN BLOOD BY AUTOMATED COUNT: 12.3 % (ref 12.3–15.4)
GLOBULIN UR ELPH-MCNC: 3 GM/DL
GLUCOSE SERPL-MCNC: 103 MG/DL (ref 65–99)
HCT VFR BLD AUTO: 52.5 % (ref 37.5–51)
HGB BLD-MCNC: 16.8 G/DL (ref 13–17.7)
MCH RBC QN AUTO: 30.2 PG (ref 26.6–33)
MCHC RBC AUTO-ENTMCNC: 32 G/DL (ref 31.5–35.7)
MCV RBC AUTO: 94.3 FL (ref 79–97)
PLATELET # BLD AUTO: 361 10*3/MM3 (ref 140–450)
PMV BLD AUTO: 11 FL (ref 6–12)
POTASSIUM SERPL-SCNC: 3.9 MMOL/L (ref 3.5–5.2)
PROT SERPL-MCNC: 6.7 G/DL (ref 6–8.5)
RBC # BLD AUTO: 5.57 10*6/MM3 (ref 4.14–5.8)
SODIUM SERPL-SCNC: 139 MMOL/L (ref 136–145)
WBC NRBC COR # BLD AUTO: 10.96 10*3/MM3 (ref 3.4–10.8)

## 2025-01-23 PROCEDURE — 99214 OFFICE O/P EST MOD 30 MIN: CPT | Performed by: STUDENT IN AN ORGANIZED HEALTH CARE EDUCATION/TRAINING PROGRAM

## 2025-01-23 PROCEDURE — 85027 COMPLETE CBC AUTOMATED: CPT | Performed by: STUDENT IN AN ORGANIZED HEALTH CARE EDUCATION/TRAINING PROGRAM

## 2025-01-23 PROCEDURE — 36415 COLL VENOUS BLD VENIPUNCTURE: CPT

## 2025-01-23 PROCEDURE — 1125F AMNT PAIN NOTED PAIN PRSNT: CPT | Performed by: STUDENT IN AN ORGANIZED HEALTH CARE EDUCATION/TRAINING PROGRAM

## 2025-01-23 PROCEDURE — 80053 COMPREHEN METABOLIC PANEL: CPT | Performed by: STUDENT IN AN ORGANIZED HEALTH CARE EDUCATION/TRAINING PROGRAM

## 2025-01-23 PROCEDURE — G2211 COMPLEX E/M VISIT ADD ON: HCPCS | Performed by: STUDENT IN AN ORGANIZED HEALTH CARE EDUCATION/TRAINING PROGRAM

## 2025-01-23 RX ORDER — TAMSULOSIN HYDROCHLORIDE 0.4 MG/1
1 CAPSULE ORAL DAILY
Qty: 30 CAPSULE | Refills: 2 | Status: CANCELLED | OUTPATIENT
Start: 2025-01-23

## 2025-01-23 RX ORDER — TADALAFIL 10 MG/1
10 TABLET ORAL DAILY
Qty: 30 TABLET | Refills: 5 | Status: SHIPPED | OUTPATIENT
Start: 2025-01-23 | End: 2025-07-22

## 2025-01-23 NOTE — PROGRESS NOTES
Chief Complaint  Pulmonary nodules    HPI:   HPI   Rich Calvillo is a 59 y.o. male who presents today to [facility] for Pulmonary nodules.  History of Present Illness  The patient presents for evaluation of erectile dysfunction, respiratory issues, and pain management.    He reports experiencing erectile dysfunction, which he attributes to his age. This issue has been ongoing for the past few months. He recalls a similar problem following his TURP procedure in 2016, which subsequently worsened. He has discontinued the use of Flomax. His urinary function is satisfactory, with no reported issues during nighttime hours.    He has a scan scheduled for tomorrow at 1:30 PM to evaluate a nodule. He has been using oxygen therapy primarily at night, with his oxygen saturation levels fluctuating between 85% and 93%. Despite these readings, he reports no significant respiratory distress. He has made modifications to his diet, incorporating healthier food choices. He has reduced his smoking habit to one pack per day but has not yet achieved complete cessation. He has been using patches as an aid in this process and has increased his consumption of hard candies. He experienced a bout of diarrhea last Saturday, which he believes may have contributed to his reduced smoking. He has been taking a multivitamin daily. He finds relief from his symptoms with the use of Trelegy.    He has been attempting to reduce his reliance on pain medication, currently taking 1.5 tablets daily. He reports persistent pain but has developed coping mechanisms to manage it.    Supplemental Information  He is seeing Dr. Lui for a hernia.    SOCIAL HISTORY  The patient admits to smoking and is currently down to 1 pack a day. He has been using patches and eating hard candies to help quit.    MEDICATIONS  Current: Trelegy, multivitamin  Discontinued: Flomax       Previous History:   Past Medical History:   Diagnosis  Date    Cancer     prostate    COPD (chronic obstructive pulmonary disease)     Hyperlipemia     Wears glasses       Past Surgical History:   Procedure Laterality Date    CARDIAC CATHETERIZATION      CYSTOSCOPY      HERNIA REPAIR Right     INGUINAL    KNEE ACL RECONSTRUCTION Right     PROSTATE BIOPSY        Social History     Socioeconomic History    Marital status: Single   Tobacco Use    Smoking status: Every Day     Current packs/day: 1.00     Average packs/day: 1 pack/day for 47.1 years (47.1 ttl pk-yrs)     Types: Cigarettes     Start date: 1978     Passive exposure: Current    Smokeless tobacco: Never   Vaping Use    Vaping status: Never Used   Substance and Sexual Activity    Alcohol use: Yes    Drug use: No    Sexual activity: Defer      Health Maintenance Due   Topic Date Due    COLORECTAL CANCER SCREENING  Never done    TDAP/TD VACCINES (1 - Tdap) Never done    ZOSTER VACCINE (1 of 2) Never done    HEPATITIS C SCREENING  Never done    COVID-19 Vaccine (1 - 2024-25 season) Never done        Current Medications:  Current Outpatient Medications   Medication Sig Dispense Refill    albuterol sulfate  (90 Base) MCG/ACT inhaler Inhale 2 puffs Every 6 (Six) Hours As Needed for Wheezing or Shortness of Air. 18 g 8    aspirin 81 MG EC tablet Take 1 tablet by mouth Daily.      Fluticasone-Umeclidin-Vilant (Trelegy Ellipta) 200-62.5-25 MCG/ACT inhaler Inhale 1 puff by mouth daily. 180 each 1    gabapentin (NEURONTIN) 300 MG capsule Take 1 capsule by mouth 3 (Three) Times a Day As Needed (Nerve Pain). (Patient taking differently: Take 1 capsule by mouth 3 (Three) Times a Day As Needed (Nerve Pain). Takes 1-2 times per day)      HYDROcodone-acetaminophen (NORCO)  MG per tablet Take 1.5 tablets by mouth Daily As Needed for Mild Pain or Moderate Pain.      ipratropium-albuterol (DUO-NEB) 0.5-2.5 mg/3 ml nebulizer Take 3 mL by nebulization Every 4 (Four) Hours As Needed for Wheezing or Shortness of Air. 360 mL  "3    methocarbamol (ROBAXIN) 750 MG tablet Take 1 tablet by mouth As Needed for Muscle Spasms.      guaiFENesin (Mucinex) 600 MG 12 hr tablet Take 1 tablet by mouth 2 (Two) Times a Day As Needed for Cough or Congestion. (Patient not taking: Reported on 1/23/2025) 20 tablet 3    nicotine (NICODERM CQ) 14 MG/24HR patch Place 1 patch on the skin as directed by provider Daily. (Patient not taking: Reported on 1/23/2025) 14 each 1    nicotine (NICODERM CQ) 21 MG/24HR patch Place 1 patch on the skin as directed by provider Daily. (Patient not taking: Reported on 1/23/2025) 21 each 1    nicotine (NICODERM CQ) 7 MG/24HR patch Place 1 patch on the skin as directed by provider Daily. (Patient not taking: Reported on 1/23/2025) 14 each 1    tadalafil (Cialis) 10 MG tablet Take 1 tablet by mouth Daily for 180 days. 30 tablet 5     No current facility-administered medications for this visit.       Allergies:   Allergies   Allergen Reactions    Ciprofloxacin Itching       Vitals:   BP 96/64 (BP Location: Right arm, Patient Position: Sitting, Cuff Size: Adult)   Pulse 90   Temp 97.3 °F (36.3 °C) (Temporal)   Ht 185.4 cm (72.99\")   Wt 86.8 kg (191 lb 6.4 oz)   SpO2 (!) 89%   BMI 25.26 kg/m²     Estimated body mass index is 25.26 kg/m² as calculated from the following:    Height as of this encounter: 185.4 cm (72.99\").    Weight as of this encounter: 86.8 kg (191 lb 6.4 oz).    Rich Calvillo  reports that he has been smoking cigarettes. He started smoking about 47 years ago. He has a 47.1 pack-year smoking history. He has been exposed to tobacco smoke. He has never used smokeless tobacco.            Physical Exam:   Physical Exam  Constitutional:       Appearance: Normal appearance.   HENT:      Mouth/Throat:      Mouth: Mucous membranes are moist.   Cardiovascular:      Rate and Rhythm: Normal rate and regular rhythm.   Pulmonary:      Effort: Pulmonary effort is normal.      Breath sounds: Normal breath sounds. No wheezing " or rhonchi.   Musculoskeletal:         General: Normal range of motion.   Skin:     General: Skin is warm and dry.   Neurological:      Mental Status: He is alert.   Psychiatric:         Mood and Affect: Mood normal.          Lab Results:   Office Visit on 09/25/2024   Component Date Value Ref Range Status    SARS Antigen 09/25/2024 Not Detected  Not Detected, Presumptive Negative Final    Influenza A Antigen MANA 09/25/2024 Not Detected  Not Detected Final    Influenza B Antigen MANA 09/25/2024 Not Detected  Not Detected Final    Internal Control 09/25/2024 Passed  Passed Final    Lot Number 09/25/2024 [id number]   Final    Expiration Date 09/25/2024 10/23/2025   Final    WBC 09/25/2024 9.04  3.40 - 10.80 10*3/mm3 Final    RBC 09/25/2024 4.81  4.14 - 5.80 10*6/mm3 Final    Hemoglobin 09/25/2024 15.0  13.0 - 17.7 g/dL Final    Hematocrit 09/25/2024 45.3  37.5 - 51.0 % Final    MCV 09/25/2024 94.2  79.0 - 97.0 fL Final    MCH 09/25/2024 31.2  26.6 - 33.0 pg Final    MCHC 09/25/2024 33.1  31.5 - 35.7 g/dL Final    RDW 09/25/2024 12.6  12.3 - 15.4 % Final    RDW-SD 09/25/2024 43.8  37.0 - 54.0 fl Final    MPV 09/25/2024 11.4  6.0 - 12.0 fL Final    Platelets 09/25/2024 227  140 - 450 10*3/mm3 Final    Glucose 09/25/2024 96  65 - 99 mg/dL Final    BUN 09/25/2024 7 (L)  8 - 23 mg/dL Final    Creatinine 09/25/2024 0.73 (L)  0.76 - 1.27 mg/dL Final    Sodium 09/25/2024 139  136 - 145 mmol/L Final    Potassium 09/25/2024 3.7  3.5 - 5.2 mmol/L Final    Chloride 09/25/2024 100  98 - 107 mmol/L Final    CO2 09/25/2024 29.7 (H)  22.0 - 29.0 mmol/L Final    Calcium 09/25/2024 9.4  8.6 - 10.5 mg/dL Final    Total Protein 09/25/2024 6.5  6.0 - 8.5 g/dL Final    Albumin 09/25/2024 3.8  3.5 - 5.2 g/dL Final    ALT (SGPT) 09/25/2024 10  1 - 41 U/L Final    AST (SGOT) 09/25/2024 11  1 - 40 U/L Final    Alkaline Phosphatase 09/25/2024 76  39 - 117 U/L Final    Total Bilirubin 09/25/2024 0.3  0.0 - 1.2 mg/dL Final    Globulin 09/25/2024  2.7  gm/dL Final    A/G Ratio 09/25/2024 1.4  g/dL Final    BUN/Creatinine Ratio 09/25/2024 9.6  7.0 - 25.0 Final    Anion Gap 09/25/2024 9.3  5.0 - 15.0 mmol/L Final    Unable to calculate Anion Gap.    eGFR 09/25/2024 104.2  >60.0 mL/min/1.73 Final    Hemoglobin A1C 09/25/2024 5.60  4.80 - 5.60 % Final    Total Cholesterol 09/25/2024 196  0 - 200 mg/dL Final    Triglycerides 09/25/2024 90  0 - 150 mg/dL Final    HDL Cholesterol 09/25/2024 45  40 - 60 mg/dL Final    LDL Cholesterol  09/25/2024 135 (H)  0 - 100 mg/dL Final    VLDL Cholesterol 09/25/2024 16  5 - 40 mg/dL Final    LDL/HDL Ratio 09/25/2024 2.96   Final   No results displayed because visit has over 200 results.          Assessment and Plan  Diagnoses and all orders for this visit:    1. Benign prostatic hyperplasia with incomplete bladder emptying (Primary)  -     tadalafil (Cialis) 10 MG tablet; Take 1 tablet by mouth Daily for 180 days.  Dispense: 30 tablet; Refill: 5    2. Stage 4 very severe COPD by GOLD classification    3. Pulmonary nodules    4. Coronary artery disease involving native coronary artery of native heart without angina pectoris  -     CBC No Differential; Future  -     Comprehensive metabolic panel; Future      Assessment & Plan  1. Erectile dysfunction.  He reports experiencing erectile dysfunction over the past couple of months, which he suspects may be related to his previous use of Flomax. He also has some urinary flow issues. A prescription for Cialis 10 mg has been issued, with instructions to take it once daily. He was informed that the medication could be purchased at [facility] for approximately $ 13 per month if paid out-of-pocket. If the initial dosage proves ineffective, he is advised to increase the dosage to two tablets daily and notify the office accordingly.    2. COPD  His oxygen saturation is currently at 89%, and he reports using oxygen primarily at night. He feels fine symptomatically and has found Trelegy  beneficial. He will continue with his current regimen, including the use of Trelegy. He is following with pulmonology.     3. Lung Nodule  Suspicious lung nodules, scheduled for CT tomorrow. If stable serial monitoring will be continued, if worsened biopsy will be considered. Following with pulmonology.       Follow-up  The patient will follow up in 4 months.    PROCEDURE  The patient underwent a TURP procedure in 2016.               New Medications:   New Medications Ordered This Visit   Medications    tadalafil (Cialis) 10 MG tablet     Sig: Take 1 tablet by mouth Daily for 180 days.     Dispense:  30 tablet     Refill:  5       Discontinued Medications:   Medications Discontinued During This Encounter   Medication Reason    tamsulosin (FLOMAX) 0.4 MG capsule 24 hr capsule *Therapy completed                 Follow Up:   Return in about 4 months (around 5/23/2025).    Patient was given instructions and counseling regarding his condition or for health maintenance advice. Please see specific information pulled into the AVS if appropriate.     Patient or patient representative verbalized consent for the use of Ambient Listening during the visit with  Fran Lopez DO for chart documentation. 1/23/2025  10:34 EST       This document has been electronically signed by Fran Lopez DO   January 23, 2025 10:34 EST      Dictated Utilizing Dragon Dictation: Part of this note may be an electronic transcription/translation of spoken language to printed text using the Dragon Dictation System.

## 2025-01-28 ENCOUNTER — TELEPHONE (OUTPATIENT)
Dept: FAMILY MEDICINE CLINIC | Facility: CLINIC | Age: 60
End: 2025-01-28
Payer: MEDICARE

## 2025-01-28 NOTE — TELEPHONE ENCOUNTER
----- Message from Fran Lopez sent at 1/28/2025  2:01 PM EST -----  Please let the patient know that his labs are normal, no concerns.

## 2025-01-31 ENCOUNTER — PATIENT OUTREACH (OUTPATIENT)
Dept: PULMONOLOGY | Facility: CLINIC | Age: 60
End: 2025-01-31
Payer: MEDICARE

## 2025-01-31 NOTE — SIGNIFICANT NOTE
Called to patient to follow up with him after no showing to his follow up chest CT scan on 1/24. Patient stated that he had car trouble on the way to the scan, which caused his absence. NN assisted patient in getting scan rescheduled for Thursday, 2/13 at 3:15pm at the [facility], as well as arrange OV follow up with Dr. Holman on Monday, 2/14 at 3:30pm. Called new appointment information to patient and placed printed reminders in the mail to him. Patient verbalized understanding and is in agreement with his plan of care.

## 2025-02-05 RX ORDER — PREDNISONE 20 MG/1
40 TABLET ORAL DAILY
Qty: 10 TABLET | Refills: 0 | Status: SHIPPED | OUTPATIENT
Start: 2025-02-05

## 2025-02-05 RX ORDER — DOXYCYCLINE 100 MG/1
100 CAPSULE ORAL 2 TIMES DAILY
Qty: 10 CAPSULE | Refills: 0 | Status: SHIPPED | OUTPATIENT
Start: 2025-02-05

## 2025-02-05 NOTE — PROGRESS NOTES
Patient's daughter contacted COPD clinic reporting that patient is having some acute respiratory symptoms and concerned that he may be having a flare-up requesting medication be sent to pharmacy. Will send in course of doxycycline and prednisone 40 mg x 5 days.

## 2025-02-12 ENCOUNTER — HOSPITAL ENCOUNTER (OUTPATIENT)
Dept: CT IMAGING | Facility: HOSPITAL | Age: 60
Discharge: HOME | End: 2025-02-12
Admitting: INTERNAL MEDICINE
Payer: MEDICARE

## 2025-02-12 DIAGNOSIS — J44.9 COPD MIXED TYPE: ICD-10-CM

## 2025-02-12 DIAGNOSIS — I27.20 PULMONARY HYPERTENSION: ICD-10-CM

## 2025-02-12 DIAGNOSIS — J96.12 CHRONIC RESPIRATORY FAILURE WITH HYPOXIA AND HYPERCAPNIA: ICD-10-CM

## 2025-02-12 DIAGNOSIS — J96.11 CHRONIC RESPIRATORY FAILURE WITH HYPOXIA AND HYPERCAPNIA: ICD-10-CM

## 2025-02-12 DIAGNOSIS — R91.8 MULTIPLE PULMONARY NODULES: ICD-10-CM

## 2025-02-12 DIAGNOSIS — Z72.0 TOBACCO ABUSE: ICD-10-CM

## 2025-02-12 PROCEDURE — 71250 CT THORAX DX C-: CPT

## 2025-02-13 ENCOUNTER — DOCUMENTATION (OUTPATIENT)
Dept: PULMONOLOGY | Facility: CLINIC | Age: 60
End: 2025-02-13
Payer: MEDICARE

## 2025-02-13 PROCEDURE — 71250 CT THORAX DX C-: CPT | Performed by: RADIOLOGY

## 2025-02-13 NOTE — PROGRESS NOTES
Patient underwent a CT scan of the chest which revealed interval increase in the previously noted right upper lobe pulmonary nodule, now about 2 cm in size.    This is most likely a primary bronchogenic carcinoma.  Based on the PET scan there is no evidence of disease outside of the chest.  He is high risk for any type of biopsy procedure and not a surgical candidate.    We will be seeing him in the office in the next couple of weeks at which time we will review his CAT scan and I will make the recommendation for referral to radiation oncology for empiric SBRT

## 2025-02-19 ENCOUNTER — TELEPHONE (OUTPATIENT)
Dept: PULMONOLOGY | Facility: CLINIC | Age: 60
End: 2025-02-19
Payer: MEDICARE

## 2025-02-19 RX ORDER — PREDNISONE 10 MG/1
TABLET ORAL
Qty: 31 TABLET | Refills: 0 | Status: SHIPPED | OUTPATIENT
Start: 2025-02-19

## 2025-02-19 NOTE — PROGRESS NOTES
Patient's daughter contacted office reporting that patient's symptoms have improved from previous COPD exacerbation but still having some residual tightness in his chest. Will send in prednisone taper.

## 2025-02-19 NOTE — TELEPHONE ENCOUNTER
Dr. Holman forwarded me his review/recommendation per the recent CT chest from February 2025.   See note from 2/13/2025 for more details.    Attempted to call both patient and daughter to discuss findings before visit and remind them of the follow-up visit on 2/24, but I was unable to reach them or leave any messages.

## 2025-02-24 ENCOUNTER — OFFICE VISIT (OUTPATIENT)
Dept: PULMONOLOGY | Facility: CLINIC | Age: 60
End: 2025-02-24
Payer: MEDICARE

## 2025-02-24 VITALS
HEART RATE: 69 BPM | WEIGHT: 188.2 LBS | TEMPERATURE: 97.8 F | HEIGHT: 73 IN | OXYGEN SATURATION: 89 % | BODY MASS INDEX: 24.94 KG/M2 | SYSTOLIC BLOOD PRESSURE: 126 MMHG | DIASTOLIC BLOOD PRESSURE: 76 MMHG

## 2025-02-24 DIAGNOSIS — Z72.0 TOBACCO ABUSE: ICD-10-CM

## 2025-02-24 DIAGNOSIS — F17.210 CIGARETTE NICOTINE DEPENDENCE WITHOUT COMPLICATION: ICD-10-CM

## 2025-02-24 DIAGNOSIS — J44.9 COPD MIXED TYPE: ICD-10-CM

## 2025-02-24 DIAGNOSIS — R91.8 MULTIPLE PULMONARY NODULES: Primary | ICD-10-CM

## 2025-02-24 DIAGNOSIS — I27.20 PULMONARY HYPERTENSION: ICD-10-CM

## 2025-02-24 DIAGNOSIS — J96.12 CHRONIC RESPIRATORY FAILURE WITH HYPOXIA AND HYPERCAPNIA: ICD-10-CM

## 2025-02-24 DIAGNOSIS — J96.11 CHRONIC RESPIRATORY FAILURE WITH HYPOXIA AND HYPERCAPNIA: ICD-10-CM

## 2025-02-24 NOTE — PROGRESS NOTES
PULMONARY  NOTE    Chief Complaint     Enlarging pulmonary nodule, stage IV COPD, chronic mixed hypoxic and hypercarbic respiratory failure, moderate to severe pulmonary hypertension, tobacco abuse    History of Present Illness     59-year-old male returns today for follow-up  I initially saw him 12/9/2024    He has ongoing tobacco abuse although was cut from 2 packs of cigarettes send 1 pack cigarettes per day    He has stage IV chronic obstructive pulmonary disease  This is complicated by chronic mixed hypoxic and hypercarbic respiratory failure  Noninvasive positive pressure ventilation has been recommended to him in the past but deferred    He remains on the same bronchodilators which she thinks may help some  This is Trelegy with albuterol    Since I saw him he had an exacerbation of COPD for which she has been treated with a course of antibiotics and steroids    He has abnormal chest imaging with multiple pulmonary nodules  He had a recent CT scan of the chest which revealed a new right upper lobe pulmonary nodule however a PET scan revealed no abnormal hypermetabolic activity  This was followed by a short interval CT scan of the chest with results as noted below    Patient Active Problem List   Diagnosis    Stage IV (Very severe) emphysema    Tobacco abuse    Multiple pulmonary nodules (Enlarging LLL)    Chronic respiratory failure with hypoxia and hypercapnia    Pulmonary hypertension (Mod-severe) - Probably WHO Group 3    Cigarette nicotine dependence without complication      Allergies   Allergen Reactions    Ciprofloxacin Itching       Current Outpatient Medications:     albuterol sulfate  (90 Base) MCG/ACT inhaler, Inhale 2 puffs Every 6 (Six) Hours As Needed for Wheezing or Shortness of Air., Disp: 18 g, Rfl: 8    aspirin 81 MG EC tablet, Take 1 tablet by mouth Daily., Disp: , Rfl:     doxycycline (VIBRAMYCIN) 100 MG capsule, Take 1 capsule by mouth 2 (Two) Times a Day., Disp: 10 capsule, Rfl:  0    Fluticasone-Umeclidin-Vilant (Trelegy Ellipta) 200-62.5-25 MCG/ACT inhaler, Inhale 1 puff by mouth daily., Disp: 180 each, Rfl: 1    gabapentin (NEURONTIN) 300 MG capsule, Take 1 capsule by mouth 3 (Three) Times a Day As Needed (Nerve Pain). (Patient taking differently: Take 1 capsule by mouth 3 (Three) Times a Day As Needed (Nerve Pain). Takes 1-2 times per day), Disp: , Rfl:     HYDROcodone-acetaminophen (NORCO)  MG per tablet, Take 1.5 tablets by mouth Daily As Needed for Mild Pain or Moderate Pain., Disp: , Rfl:     ipratropium-albuterol (DUO-NEB) 0.5-2.5 mg/3 ml nebulizer, Take 3 mL by nebulization Every 4 (Four) Hours As Needed for Wheezing or Shortness of Air., Disp: 360 mL, Rfl: 3    methocarbamol (ROBAXIN) 750 MG tablet, Take 1 tablet by mouth As Needed for Muscle Spasms., Disp: , Rfl:     predniSONE (DELTASONE) 10 MG tablet, Take 4 tablets by mouth daily for 3 days, then 3 tablets daily for 3 days, then 2 tablets daily for 3 days, then 1 tablet daily for 3 days., Disp: 31 tablet, Rfl: 0    tadalafil (Cialis) 10 MG tablet, Take 1 tablet by mouth Daily for 180 days., Disp: 30 tablet, Rfl: 5    guaiFENesin (Mucinex) 600 MG 12 hr tablet, Take 1 tablet by mouth 2 (Two) Times a Day As Needed for Cough or Congestion. (Patient not taking: Reported on 2/24/2025), Disp: 20 tablet, Rfl: 3    nicotine (NICODERM CQ) 14 MG/24HR patch, Place 1 patch on the skin as directed by provider Daily. (Patient not taking: Reported on 12/17/2024), Disp: 14 each, Rfl: 1    nicotine (NICODERM CQ) 21 MG/24HR patch, Place 1 patch on the skin as directed by provider Daily. (Patient not taking: Reported on 2/24/2025), Disp: 21 each, Rfl: 1    nicotine (NICODERM CQ) 7 MG/24HR patch, Place 1 patch on the skin as directed by provider Daily. (Patient not taking: Reported on 12/17/2024), Disp: 14 each, Rfl: 1  MEDICATION LIST AND ALLERGIES REVIEWED.    Family History   Problem Relation Age of Onset    Hyperlipidemia Father      "Cancer Sister     Cancer Brother      Social History     Tobacco Use    Smoking status: Every Day     Current packs/day: 1.00     Average packs/day: 1 pack/day for 47.1 years (47.1 ttl pk-yrs)     Types: Cigarettes     Start date: 1978     Passive exposure: Current    Smokeless tobacco: Never   Vaping Use    Vaping status: Never Used   Substance Use Topics    Alcohol use: Yes    Drug use: No     Social History     Social History Narrative    Not on file     FAMILY AND SOCIAL HISTORY REVIEWED.    Review of Systems  IF PRESENT REFER TO SCANNED ROS SHEET FROM SAME DATE  OTHERWISE ROS OBTAINED AND NON-CONTRIBUTORY OVER HPI.    /76   Pulse 69   Temp 97.8 °F (36.6 °C)   Ht 185.4 cm (72.99\")   Wt 85.4 kg (188 lb 3.2 oz)   SpO2 (!) 89%   BMI 24.84 kg/m²   Physical Exam  Vitals and nursing note reviewed.   Constitutional:       General: He is not in acute distress.     Appearance: He is well-developed. He is not diaphoretic.   HENT:      Head: Normocephalic and atraumatic.   Neck:      Thyroid: No thyromegaly.   Cardiovascular:      Rate and Rhythm: Normal rate and regular rhythm.      Heart sounds: Normal heart sounds. No murmur heard.  Pulmonary:      Effort: Pulmonary effort is normal.      Breath sounds: No stridor.      Comments: Decreased breath sounds bilaterally  Musculoskeletal:         General: Normal range of motion.   Lymphadenopathy:      Cervical: No cervical adenopathy.      Upper Body:      Right upper body: No supraclavicular or epitrochlear adenopathy.      Left upper body: No supraclavicular or epitrochlear adenopathy.   Skin:     General: Skin is warm and dry.   Neurological:      Mental Status: He is alert and oriented to person, place, and time.   Psychiatric:         Behavior: Behavior normal.         Results     CT scan of the chest from 2/12/2025 reviewed on PACS  Interval increase in the left lower lobe solid nodule    Immunization History   Administered Date(s) Administered    Fluzone " (or Fluarix & Flulaval for VFC) >6mos 10/19/2015, 10/15/2018    Pneumococcal Conjugate 20-Valent (PCV20) 12/19/2024    Tetanus Toxoid 09/18/2009     Problem List       ICD-10-CM ICD-9-CM   1. Multiple pulmonary nodules (Enlarging LLL)  R91.8 793.19   2. Stage IV (Very severe) emphysema  J44.9 496   3. Pulmonary hypertension (Mod-severe) - Probably WHO Group 3  I27.20 416.8   4. Tobacco abuse  Z72.0 305.1   5. Cigarette nicotine dependence without complication  F17.210 305.1   6. Chronic respiratory failure with hypoxia and hypercapnia  J96.11 518.83    J96.12 799.02     786.09       Discussion     We reviewed his chest imaging together on PACS  The left lower lobe pulmonary nodule has continued to show interval increase highly suspicious for a bronchogenic carcinoma  No other progressive intrathoracic findings  Redemonstrated is his very severe emphysema    We again discussed options  He is not, and I do not think he ever will be, a candidate for a biopsy  With his pulmonary hypertension, chronic respiratory failure, and advanced stage COPD I do not think it would be safe to put him to sleep for bronchoscopy  He is definitely not a surgical candidate  The left-sided lesion is not amenable to CT FNA without a high risk of pneumothorax.    Therefore, I think empiric SBRT would be a reasonable option  However, it has been about 5 months since his last PET scan so I would recommend repeating his PET scan to make sure there is not abnormal hypermetabolic activity elsewhere.    Based on the PET scan, we will probably make a referral to radiation oncology    I have encouraged his smoking cessation efforts    Plan to see him back in follow-up    This visit represents an established relationship with whom the provider is providing ongoing longitudinal care related to serious and/or complex conditions    Level of service justified based on 42 minutes spent in patient care on this date of service including, but not limited to:  preparing to see the patient, obtaining and/or reviewing history, performing medically appropriate examination, ordering tests/medicine/procedures, independently interpreting results, documenting clinical information in EHR, and counseling/education of patient/family/caregiver (excluding time spent on other separate services such as performing procedures or test interpretation, if applicable). (Level 4 30-39 minutes; Level 5 40-54 minutes)    Jayson Holman MD  Note electronically signed    CC: Fran Lopez DO

## 2025-03-05 ENCOUNTER — HOSPITAL ENCOUNTER (OUTPATIENT)
Dept: PET IMAGING | Facility: HOSPITAL | Age: 60
Discharge: HOME | End: 2025-03-05
Payer: MEDICARE

## 2025-03-05 DIAGNOSIS — Z72.0 TOBACCO ABUSE: ICD-10-CM

## 2025-03-05 DIAGNOSIS — R91.8 MULTIPLE PULMONARY NODULES: ICD-10-CM

## 2025-03-05 DIAGNOSIS — J96.12 CHRONIC RESPIRATORY FAILURE WITH HYPOXIA AND HYPERCAPNIA: ICD-10-CM

## 2025-03-05 DIAGNOSIS — F17.210 CIGARETTE NICOTINE DEPENDENCE WITHOUT COMPLICATION: ICD-10-CM

## 2025-03-05 DIAGNOSIS — J44.9 COPD MIXED TYPE: ICD-10-CM

## 2025-03-05 DIAGNOSIS — J96.11 CHRONIC RESPIRATORY FAILURE WITH HYPOXIA AND HYPERCAPNIA: ICD-10-CM

## 2025-03-05 DIAGNOSIS — I27.20 PULMONARY HYPERTENSION: ICD-10-CM

## 2025-03-05 PROCEDURE — 78815 PET IMAGE W/CT SKULL-THIGH: CPT

## 2025-03-05 PROCEDURE — A9552 F18 FDG: HCPCS | Performed by: INTERNAL MEDICINE

## 2025-03-05 PROCEDURE — 34310000005 FLUDEOXYGLUCOSE F18 SOLUTION: Performed by: INTERNAL MEDICINE

## 2025-03-05 RX ADMIN — FLUDEOXYGLUCOSE F 18 1 DOSE: 200 INJECTION, SOLUTION INTRAVENOUS at 14:43

## 2025-03-10 DIAGNOSIS — C34.92 PRIMARY LUNG CANCER, LEFT: Primary | ICD-10-CM

## 2025-03-10 NOTE — PROGRESS NOTES
The patient underwent a PET scan which revealed abnormal hypermetabolic activity in the enlarging pulmonary nodule in the left lower lobe.  This makes this lesion highly likely to be malignant.  As previously noted, he is not a biopsy candidate given the severity of his lung disease.    I discussed these results with the patient on the phone.  At this point I would recommend referral to radiation oncology for consideration of empiric SBRT.

## 2025-03-12 ENCOUNTER — TELEPHONE (OUTPATIENT)
Dept: PULMONOLOGY | Facility: CLINIC | Age: 60
End: 2025-03-12
Payer: MEDICARE

## 2025-03-12 RX ORDER — PREDNISONE 20 MG/1
40 TABLET ORAL DAILY
Qty: 10 TABLET | Refills: 0 | Status: SHIPPED | OUTPATIENT
Start: 2025-03-12

## 2025-03-12 NOTE — TELEPHONE ENCOUNTER
Caller: DAVID MARKHAM    Relationship: Emergency Contact    Best call back number: [phone]     Requested Prescriptions:   - doxycycline (VIBRAMYCIN) 100 MG capsule     - predniSONE (DELTASONE) 10 MG tablet     Pharmacy where request should be sent: [id number] [place] [address] AT [facility] [address] - [phone]  - [phone] FX     Additional details provided by patient: PATIENT DAUGHTER STATES SHE CALLED IN EARLIER BUT HADN'T RECEIVED A RESPONSE. AGENT ADVISED IT CAN TAKE UP TO 48 HOURS.   SHE STATED PATIENT NEEDS HIS ANTIBIOTIC AND STEROIDS DUE TO A FLARE UP.    Does the patient have less than a 3 day supply:  [] Yes  [] No    Would you like a call back once the refill request has been completed: [] Yes [] No    If the office needs to give you a call back, can they leave a voicemail: [] Yes [] No

## 2025-03-12 NOTE — TELEPHONE ENCOUNTER
Discussed with patient. He states for the past 2 days he has had increased cough with mucous production. He states he is feeling more short of breath and wheezing. Will send in a course of Augmentin x 5 days and prednisone 40 mg x 5 days.

## 2025-03-18 ENCOUNTER — HOSPITAL ENCOUNTER (OUTPATIENT)
Dept: PULMONOLOGY | Facility: HOSPITAL | Age: 60
Discharge: HOME | End: 2025-03-18
Admitting: PHYSICIAN ASSISTANT
Payer: MEDICARE

## 2025-03-18 VITALS
WEIGHT: 191 LBS | HEART RATE: 87 BPM | BODY MASS INDEX: 25.2 KG/M2 | DIASTOLIC BLOOD PRESSURE: 69 MMHG | OXYGEN SATURATION: 87 % | SYSTOLIC BLOOD PRESSURE: 129 MMHG

## 2025-03-18 DIAGNOSIS — I27.20 PULMONARY HYPERTENSION: ICD-10-CM

## 2025-03-18 DIAGNOSIS — R91.8 MULTIPLE PULMONARY NODULES: ICD-10-CM

## 2025-03-18 DIAGNOSIS — F17.210 CIGARETTE NICOTINE DEPENDENCE WITHOUT COMPLICATION: ICD-10-CM

## 2025-03-18 DIAGNOSIS — J96.12 CHRONIC RESPIRATORY FAILURE WITH HYPOXIA AND HYPERCAPNIA: ICD-10-CM

## 2025-03-18 DIAGNOSIS — J44.9 STAGE 3 SEVERE COPD BY GOLD CLASSIFICATION: Primary | ICD-10-CM

## 2025-03-18 DIAGNOSIS — J96.11 CHRONIC RESPIRATORY FAILURE WITH HYPOXIA AND HYPERCAPNIA: ICD-10-CM

## 2025-03-18 PROCEDURE — 94664 DEMO&/EVAL PT USE INHALER: CPT

## 2025-03-18 RX ORDER — NICOTINE 21 MG/24HR
1 PATCH, TRANSDERMAL 24 HOURS TRANSDERMAL EVERY 24 HOURS
Qty: 14 EACH | Refills: 2 | Status: SHIPPED | OUTPATIENT
Start: 2025-03-18

## 2025-03-18 RX ORDER — NICOTINE 21 MG/24HR
1 PATCH, TRANSDERMAL 24 HOURS TRANSDERMAL EVERY 24 HOURS
Qty: 28 EACH | Refills: 2 | Status: SHIPPED | OUTPATIENT
Start: 2025-03-18

## 2025-03-18 NOTE — PROGRESS NOTES
"COPD Education    NAME:___Rich Calvillo  :_1965_  APPOINTMENT DATE/TIME:___________25___11:00 EDT___________    COPD Education Evaluation            COPD Education Completed (See Note) Yes     COPD Clinic encounter: 4th    Referring/consulting Provider: Dr. Bender     Reason for Consultation:  COPD Exacerbation   COPD Diagnosis Length 20 years     Current Outpatient Pulmonologist     NO           Subjective            Spirometry      Spirometry Completed YES     FVC 70%     Fev1 34%     Fev1/FVC 38     GOLD Stage 3           Classification of Airflow Limitation Severity in COPD (Based on Post-Bronchodilator FEV1)           Gold 1: Mild FEV1 >= 80% predicted      Gold 2:  Moderate 50% <= FEV1 < 80% predicted   Gold 3: Severe 30% >= FEV1 < 50% predicted   Gold 4: Very Severe FEV1 < 30% predicted            Dyspnea:        Do you have Dyspnea? Yes and With Exertion.       DME Equipment Used:              Home O2? YES     Home O2 device? Home O2 Concentrator  Portable O2 Tanks     Nebulizer YES     NIPPV None      Objective:        Barriers to Learning? No    Discussed:             COPD education given via the booklet \"A Patient's Guide to COPD\".     COPD Zones: (Green/yellow/Red): YES     Exacerbation or Flare up signs and symptoms: YES       COPD Medication Non-Compliance YES       Managing Medications: YES     Patient understands use of Rescue Medications: YES     Type of Rescue inhaler patient currently has: Albuterol and Duoneb nebulizer solution   Patient understands use of Maintenance Medications: YES     Type of Maintenance inhaler patient currently has: ICS/LABA/LAMA and (Trelegy 200 mcg)    Proper MDI technique (w/wo Spacer): YES and Patient understands proper inhaler technique for his Trelegy and rescue MDI. He states he has been using his SPACER with his MDI and feels he is getting more benefit of his Rescue inhlaer.     Provided patient a Spacer: YES and During previous visit.       Smoking " Cessation information offered to patient: YES and He was unable to get his Nicotine patches after his last visit but states he would like to get them now. SHYANN Thompson to order .     Nicotine Replacement Therapy Discussed YES     Breathing Techniques:            Purse-lipped breathing YES     Oxygen therapy SAFETY:  YES     COVID Vaccination Status:             Action Plan            Aerobika for sputum mobilization: NO and Aerobika during a previous visit.     Rescue regimen ordered: NO     COPD Maintenance Inhaler ordered: YES and Trelegy     COPD/Lung Health Clinic follow up scheduled: YES and 6/18/2025     Education Minutes with patient: YES and 30 minutes       Goals Discussed with patient: Become smoke free., Take medications as ordered., GO to Dr. appointments., Increase activity., Eat healthier., Increase use of pursed lip breathing., Decrease flare-ups., and Increase COPD Knowledge.    Comments:     Mr. Calvillo presents to the COPD Clinic for a follow up visit. He currently has home and portable oxygen via tanks. Aminta had ordered him a POC through [employer] September of 2024 but he has yet to receive it. I called [employer], they had the wrong phone number to reach him. A new order will be sent to get him a POC. He uses Trelegy 200 mcg for maintenance. He has Albuterol MDI and Duoneb nebulizer solution for rescue. He states most of the time he only uses his Albuterol MDI and is doing well. Mr. Calvillo is using home oxygen at night and PRN during the daytime.    During Previous visits, he completed:    -Spirometry: results shown above  -6MWT: He completed the walk test, walking a total of 315 meters. His lowest SPO2 was 77% during the walk test. He did increase back to 90%  post test on room air while recovering.   -Reviewed his Alpha 1 Genotype result: M/M,  -Began COPD Education    Today, we:  -He was unable to get the nicotine patches Donna Neal PA-C, ordered on his last visit. SHYANN Lemos,  re-ordered them today.  -continued discussing the COPD educational topics listed above.       When he returns, we will:    -review his adherence to the NRT,  -Continue discussing COPD Education    CRIS Cardenas, RRT  COPD Navigator  03/18/25

## 2025-03-18 NOTE — PROGRESS NOTES
[facility]  COPD Management     Rich Calvillo is a 59 y.o. male seen in the [facility] for COPD. The patient's current COPD regimen includes: Trelegy: 1 puff QD, PRN Albuterol HFA and PRN Duo-Nebs.  Pt reports fair adherence to maintenance regimen. Pt reports he feels well on the current regimen. He reports he uses his albuterol HFA every other day and uses his nebs twice daily, but sometimes he doesn't use nebulizer at all. He reports rarely needing to use the Mucinex.     Rich Calvillo reports he smokes 1.5 ppd and has smoked for 40 years. He smokes the first cigarette of the day as soon as he wakes up.  He is interested in quitting. He had his PCP send nicotine patches to [facility], but he reports he never picked them up.       Past Medical History:   Diagnosis Date    Cancer     prostate    COPD (chronic obstructive pulmonary disease)     Hyperlipemia     Wears glasses      Social History     Socioeconomic History    Marital status: Single   Tobacco Use    Smoking status: Every Day     Current packs/day: 1.00     Average packs/day: 1 pack/day for 47.2 years (47.2 ttl pk-yrs)     Types: Cigarettes     Start date: 1978     Passive exposure: Current    Smokeless tobacco: Never   Vaping Use    Vaping status: Never Used   Substance and Sexual Activity    Alcohol use: Yes    Drug use: No    Sexual activity: Defer     Ciprofloxacin    Current Outpatient Medications:     albuterol sulfate  (90 Base) MCG/ACT inhaler, Inhale 2 puffs Every 6 (Six) Hours As Needed for Wheezing or Shortness of Air., Disp: 18 g, Rfl: 8    amoxicillin-clavulanate (AUGMENTIN) 875-125 MG per tablet, Take 1 tablet by mouth 2 (Two) Times a Day., Disp: 10 tablet, Rfl: 0    aspirin 81 MG EC tablet, Take 1 tablet by mouth Daily., Disp: , Rfl:     Fluticasone-Umeclidin-Vilant (Trelegy Ellipta) 200-62.5-25 MCG/ACT inhaler, Inhale 1 puff by mouth daily., Disp: 180 each, Rfl: 1    gabapentin (NEURONTIN)  300 MG capsule, Take 1 capsule by mouth 3 (Three) Times a Day As Needed (Nerve Pain)., Disp: , Rfl:     guaiFENesin (Mucinex) 600 MG 12 hr tablet, Take 1 tablet by mouth 2 (Two) Times a Day As Needed for Cough or Congestion., Disp: 20 tablet, Rfl: 3    HYDROcodone-acetaminophen (NORCO)  MG per tablet, Take 1.5 tablets by mouth Daily As Needed for Mild Pain or Moderate Pain., Disp: , Rfl:     ipratropium-albuterol (DUO-NEB) 0.5-2.5 mg/3 ml nebulizer, Take 3 mL by nebulization Every 4 (Four) Hours As Needed for Wheezing or Shortness of Air., Disp: 360 mL, Rfl: 3    methocarbamol (ROBAXIN) 750 MG tablet, Take 1 tablet by mouth As Needed for Muscle Spasms., Disp: , Rfl:     nicotine (NICODERM CQ) 14 MG/24HR patch, Place 1 patch on the skin as directed by provider Daily., Disp: 14 each, Rfl: 2    nicotine (NICODERM CQ) 21 MG/24HR patch, Place 1 patch on the skin as directed by provider Daily., Disp: 28 each, Rfl: 2    nicotine (NICODERM CQ) 7 MG/24HR patch, Place 1 patch on the skin as directed by provider Daily., Disp: 14 each, Rfl: 2    predniSONE (DELTASONE) 20 MG tablet, Take 2 tablets by mouth Daily., Disp: 10 tablet, Rfl: 0    tadalafil (Cialis) 10 MG tablet, Take 1 tablet by mouth Daily for 180 days., Disp: 30 tablet, Rfl: 5      Vaccination Status     Influenza: Declines, says he had a bad experience after getting   Pneumococcal: Declines  Zoster: Declines    Drug-Drug Interactions: None    Drug-Disease Interactions (non-cardioselective beta blockers): None    Patient Assistance: None at this time    Inhaler Technique Observed? No    Treatment Goals: Risk Reduction and Symptom Control     Medication Assessment & Plan:     He will continue Trelegy 1 puff daily.   Advised Pt on the importance of continuing maintenance inhaler regimen and the importance of rinsing mouth after ICS use.  Counseled Pt on the importance of smoking cessation. Aminta sent in nicotine patches for the patient.   Recommended  vaccination: flu & pneumonia, he declines today.     Thank you for allowing me to participate in the care of your patient,    Lauraaditi Yarbrough, [facility]  3/18/2025  12:35 EDT

## 2025-03-18 NOTE — PROGRESS NOTES
Subjective      Chief Complaint  COPD    Subjective      History of Present Illness  Rich Calvillo is a 59 y.o. male who presents today to [facility] with past medical history of BPH, moderate to severe pulmonary hypertension, COPD, pulmonary nodules, and tobacco abuse who presents today for COPD. This visit is a follow up appointment.     COPD:  Patient presents today for his follow-up appointment. He reports that he is doing well. He feels that his breathing is currently at baseline. He denies any worsening shortness of breath, cough, or wheezing at this time. He is compliant with using his Trelegy inhaler 1 puff daily. He uses his albuterol inhaler as needed and uses about every other day. He rarely has to use his DuoNeb treatments. He was previously ordered a POC but hasn't received this yet. [facility] was contacted and they had the incorrect contact information for the patient so requested a new order to be sent and can reach back out. He continues to smoke 1 pack per day and is interested in quitting. He is requesting nicotine patches. He is scheduled for a consult with radiation/oncology on 3/19 for further evaluation of hypermetabolic nodules in the lungs.         Current Outpatient Medications:     albuterol sulfate  (90 Base) MCG/ACT inhaler, Inhale 2 puffs Every 6 (Six) Hours As Needed for Wheezing or Shortness of Air., Disp: 18 g, Rfl: 8    amoxicillin-clavulanate (AUGMENTIN) 875-125 MG per tablet, Take 1 tablet by mouth 2 (Two) Times a Day., Disp: 10 tablet, Rfl: 0    aspirin 81 MG EC tablet, Take 1 tablet by mouth Daily., Disp: , Rfl:     Fluticasone-Umeclidin-Vilant (Trelegy Ellipta) 200-62.5-25 MCG/ACT inhaler, Inhale 1 puff by mouth daily., Disp: 180 each, Rfl: 1    gabapentin (NEURONTIN) 300 MG capsule, Take 1 capsule by mouth 3 (Three) Times a Day As Needed (Nerve Pain)., Disp: , Rfl:     guaiFENesin (Mucinex) 600 MG 12 hr tablet, Take 1 tablet by mouth 2  "(Two) Times a Day As Needed for Cough or Congestion. (Patient not taking: Reported on 3/19/2025), Disp: 20 tablet, Rfl: 3    HYDROcodone-acetaminophen (NORCO)  MG per tablet, Take 1.5 tablets by mouth Daily As Needed for Mild Pain or Moderate Pain., Disp: , Rfl:     ipratropium-albuterol (DUO-NEB) 0.5-2.5 mg/3 ml nebulizer, Take 3 mL by nebulization Every 4 (Four) Hours As Needed for Wheezing or Shortness of Air., Disp: 360 mL, Rfl: 3    methocarbamol (ROBAXIN) 750 MG tablet, Take 1 tablet by mouth As Needed for Muscle Spasms., Disp: , Rfl:     nicotine (NICODERM CQ) 14 MG/24HR patch, Place 1 patch on the skin as directed by provider Daily. (Patient not taking: Reported on 3/19/2025), Disp: 14 each, Rfl: 2    nicotine (NICODERM CQ) 21 MG/24HR patch, Place 1 patch on the skin as directed by provider Daily. (Patient not taking: Reported on 3/19/2025), Disp: 28 each, Rfl: 2    nicotine (NICODERM CQ) 7 MG/24HR patch, Place 1 patch on the skin as directed by provider Daily. (Patient not taking: Reported on 3/19/2025), Disp: 14 each, Rfl: 2    predniSONE (DELTASONE) 20 MG tablet, Take 2 tablets by mouth Daily., Disp: 10 tablet, Rfl: 0    tadalafil (Cialis) 10 MG tablet, Take 1 tablet by mouth Daily for 180 days., Disp: 30 tablet, Rfl: 5      Allergies   Allergen Reactions    Ciprofloxacin Itching       Objective     Objective   Vital Signs:  /69   Pulse 87   Wt 86.6 kg (191 lb)   SpO2 (!) 87%   BMI 25.20 kg/m²   Estimated body mass index is 25.2 kg/m² as calculated from the following:    Height as of 2/24/25: 185.4 cm (72.99\").    Weight as of this encounter: 86.6 kg (191 lb).    BMI is within normal parameters. No other follow-up for BMI required.    Past Medical History:   Diagnosis Date    Cancer     prostate    COPD (chronic obstructive pulmonary disease)     Hyperlipemia     Lung cancer     Wears glasses      Past Surgical History:   Procedure Laterality Date    CARDIAC CATHETERIZATION      CYSTOSCOPY " "     HERNIA REPAIR Right     INGUINAL    KNEE ACL RECONSTRUCTION Right     PROSTATE BIOPSY       Social History     Socioeconomic History    Marital status: Single   Tobacco Use    Smoking status: Every Day     Current packs/day: 1.00     Average packs/day: 1 pack/day for 47.2 years (47.2 ttl pk-yrs)     Types: Cigarettes     Start date: 1978     Passive exposure: Current    Smokeless tobacco: Never   Vaping Use    Vaping status: Never Used   Substance and Sexual Activity    Alcohol use: Yes    Drug use: No    Sexual activity: Defer      Physical Exam  Constitutional:       General: He is awake.      Appearance: Normal appearance. He is overweight.   HENT:      Head: Normocephalic and atraumatic.      Nose: Nose normal.      Mouth/Throat:      Mouth: Mucous membranes are moist.      Pharynx: Oropharynx is clear.   Eyes:      Conjunctiva/sclera: Conjunctivae normal.      Pupils: Pupils are equal, round, and reactive to light.   Cardiovascular:      Rate and Rhythm: Normal rate and regular rhythm.      Pulses: Normal pulses.      Heart sounds: Normal heart sounds. No murmur heard.     No friction rub. No gallop.   Pulmonary:      Effort: Pulmonary effort is normal. No tachypnea, accessory muscle usage or respiratory distress.      Breath sounds: Normal breath sounds. No decreased breath sounds, wheezing, rhonchi or rales.   Musculoskeletal:         General: Normal range of motion.      Cervical back: Full passive range of motion without pain and normal range of motion.   Skin:     General: Skin is warm and dry.   Neurological:      General: No focal deficit present.      Mental Status: He is alert. Mental status is at baseline.   Psychiatric:         Mood and Affect: Mood normal.         Behavior: Behavior normal. Behavior is cooperative.         Thought Content: Thought content normal.          Result Review :  The following labs and radiology results have been reviewed.    Lab Review:   No results found for: \"FEV1\", " "\"FVC\", \"PVD4UCF\", \"TLC\", \"DLCO\"  Lab on 01/23/2025   Component Date Value Ref Range Status    WBC 01/23/2025 10.96 (H)  3.40 - 10.80 10*3/mm3 Final    RBC 01/23/2025 5.57  4.14 - 5.80 10*6/mm3 Final    Hemoglobin 01/23/2025 16.8  13.0 - 17.7 g/dL Final    Hematocrit 01/23/2025 52.5 (H)  37.5 - 51.0 % Final    MCV 01/23/2025 94.3  79.0 - 97.0 fL Final    MCH 01/23/2025 30.2  26.6 - 33.0 pg Final    MCHC 01/23/2025 32.0  31.5 - 35.7 g/dL Final    RDW 01/23/2025 12.3  12.3 - 15.4 % Final    RDW-SD 01/23/2025 42.3  37.0 - 54.0 fl Final    MPV 01/23/2025 11.0  6.0 - 12.0 fL Final    Platelets 01/23/2025 361  140 - 450 10*3/mm3 Final    Glucose 01/23/2025 103 (H)  65 - 99 mg/dL Final    BUN 01/23/2025 8  8 - 23 mg/dL Final    Creatinine 01/23/2025 0.86  0.76 - 1.27 mg/dL Final    Sodium 01/23/2025 139  136 - 145 mmol/L Final    Potassium 01/23/2025 3.9  3.5 - 5.2 mmol/L Final    Chloride 01/23/2025 100  98 - 107 mmol/L Final    CO2 01/23/2025 31.9 (H)  22.0 - 29.0 mmol/L Final    Calcium 01/23/2025 9.5  8.6 - 10.5 mg/dL Final    Total Protein 01/23/2025 6.7  6.0 - 8.5 g/dL Final    Albumin 01/23/2025 3.7  3.5 - 5.2 g/dL Final    ALT (SGPT) 01/23/2025 13  1 - 41 U/L Final    AST (SGOT) 01/23/2025 14  1 - 40 U/L Final    Alkaline Phosphatase 01/23/2025 86  39 - 117 U/L Final    Total Bilirubin 01/23/2025 0.3  0.0 - 1.2 mg/dL Final    Globulin 01/23/2025 3.0  gm/dL Final    A/G Ratio 01/23/2025 1.2  g/dL Final    BUN/Creatinine Ratio 01/23/2025 9.3  7.0 - 25.0 Final    Anion Gap 01/23/2025 7.1  5.0 - 15.0 mmol/L Final    eGFR 01/23/2025 99.1  >60.0 mL/min/1.73 Final        Imaging Results (Most Recent)       None            Radiology Review:   Imaging Results (Last 72 Hours)       ** No results found for the last 72 hours. **          Reviewed CT chest without contrast from 2/12/2025  Narrative & Impression  EXAM:    CT Chest Without Intravenous Contrast     EXAM DATE:    2/12/2025 3:34 PM     CLINICAL HISTORY:    FU " Pulmonary ndoules; R91.8-Other nonspecific abnormal finding of lung  field; J96.11-Chronic respiratory failure with hypoxia; J96.12-Chronic  respiratory failure with hypercapnia; J44.9-Chronic obstructive  pulmonary disease, unspecified; Z72.0-Tobacco use; I27.20-Pulmonary  hypertension, unspecified     TECHNIQUE:    Axial computed tomography images of the chest without intravenous  contrast.  Sagittal and coronal reformatted images were created and  reviewed.  This CT exam was performed using one or more of the following  dose reduction techniques:  automated exposure control, adjustment of  the mA and/or kV according to patient size, and/or use of iterative  reconstruction technique.     COMPARISON:    9/4/2024     FINDINGS:    LIMITATIONS:  Please note that reported measurements are made  manually, as computer generated (AI) measurements can over measure  lesions. Additionally, lesions identified by AI may have been present  presently, significant nodules will be discussed in the report and the  visual depictions of lesions provided by AI are for general reference  only.    LUNGS AND PLEURAL SPACES:  Other patchy subpleural airspace opacities  have mostly resolved.  Increased size of left lower lobe pulmonary  nodule now measuring 2 cm and was 1.1 cm.  Previously measured right  upper lobe pulmonary nodule is grossly stable.  Fairly extensive  centrilobular emphysema.  No significant effusion.    HEART:  Unremarkable as visualized.  No cardiomegaly.  No significant  pericardial effusion.  No significant coronary artery calcifications.    MEDIASTINUM:  A precarinal region lymph node measuring 2.1 cm is  stable.    BONES/JOINTS:  Unremarkable as visualized.  No acute fracture.  No  dislocation.    SOFT TISSUES:  Unremarkable as visualized.    VASCULATURE:  Unremarkable as visualized.  No thoracic aortic  aneurysm.    LYMPH NODES:  See above.     IMPRESSION:  1.  Other patchy subpleural airspace opacities have  mostly resolved.  2.  Increased size of left lower lobe pulmonary nodule now measuring 2  cm and was 1.1 cm.  3.  Previously measured right upper lobe pulmonary nodule is grossly  stable.  4.  A precarinal region lymph node measuring 2.1 cm is stable.     This report was finalized on 2/13/2025 7:34 AM by Dr. Deandre Chicas MD.     Reviewed PET/CT imaging from 03/05/2025  Narrative & Impression   EXAM:    PET/CT Skull Base to Mid Thigh     EXAM DATE:    3/6/2025 8:40 AM     CLINICAL HISTORY:    Enlargin pulmonary nodule; R91.8-Other nonspecific abnormal finding of  lung field; J44.9-Chronic obstructive pulmonary disease, unspecified;  I27.20-Pulmonary hypertension, unspecified; Z72.0-Tobacco use;  F17.210-Nicotine dependence, cigarettes, uncomplicated; J96.11-Chronic  respiratory failure with hypoxia; J96.12-Chronic respiratory failure  with hypercapnia     TECHNIQUE:    Axial Positron Emission Tomography / Computed Tomography images were  obtained from skull base to mid thigh following the intravenous  administration of F-18 FDG. MIP reconstructed images were reviewed.     COMPARISON:    11/24/2024     FINDINGS:      HEAD:    Brain and extra-axial spaces:  Unremarkable as visualized.    Nasopharynx:  Unremarkable as visualized.      NECK:    Hypopharynx:  Unremarkable as visualized.    Larynx:  Unremarkable as visualized.    Trachea:  Unremarkable as visualized.    Retropharyngeal space:  Unremarkable as visualized.    Submandibular/parotid glands:  Unremarkable as visualized.  Glands are  normal in size.    Thyroid:  Unremarkable as visualized.  No enlarged or calcified  nodules.      CHEST:    Lungs and pleural spaces:  LEFT LOWER LOBE NODULE measures 1.7 cm with  PET hypermetabolism with mSUV of 5.7.  RIGHT UPPER LOBE NODULE currently  measures 1 cm and was 1 cm and now with PET hypermetabolism mSUV of 2  and was 2.8.  Emphysematous lungs noted.  No consolidation.  No  significant effusion.  No pneumothorax.     Heart:  Cardiomegaly.  No significant pericardial effusion.    Mediastinum:  Unremarkable as visualized.  No mass.      ABDOMEN:    Liver:  Unremarkable as visualized.    Gallbladder and bile ducts:  Unremarkable as visualized.  No calcified  stones.  No ductal dilation.    Pancreas:  Unremarkable as visualized.  No ductal dilation.    Spleen:  Unremarkable as visualized.  No splenomegaly.    Adrenals:  Unremarkable as visualized.  No mass.    Kidneys and ureters:  Unremarkable as visualized.    Stomach and bowel:  Unremarkable as visualized.  No obstruction.      PELVIS:    Appendix:  No findings to suggest acute appendicitis.    Bladder:  Unremarkable as visualized.    Reproductive:  Unremarkable as visualized.      WHOLE BODY:    Intraperitoneal space:  Unremarkable as visualized.  No significant  fluid collection.  No free air.    Bones/joints:  Unremarkable as visualized.  No acute fracture.  No  dislocation.    Soft tissues:  Unremarkable as visualized.    Vasculature:  Unremarkable as visualized.  No aortic aneurysm.    Lymph nodes:  Unremarkable as visualized.  No lymphadenopathy.  No  hypermetabolic activity.     IMPRESSION:  1. Now LEFT LOWER LOBE NODULE measures 1.7 cm with PET hypermetabolism  with mSUV of 5.7. Previously no significant PET hypermetabolism above  baseline.  2.  RIGHT UPPER LOBE NODULE currently measures 1 cm and was 1 cm and now  with PET hypermetabolism mSUV of 2 and was 2.8.  3.  Emphysematous lungs noted.     This report was finalized on 3/6/2025 8:45 AM by Dr. Deandre Chicas MD.       Results for orders placed during the hospital encounter of 09/04/24    Adult Transthoracic Echo Complete W/ Cont if Necessary Per Protocol    Interpretation Summary    Left ventricular systolic function is normal. Left ventricular ejection fraction appears to be 56 - 60%.    Left ventricular wall thickness is consistent with mild concentric hypertrophy.    The right ventricular cavity is mild to  moderately dilated with normal systolic function.    The right atrial cavity is mild to moderately dilated in limited views.    Estimated right ventricular systolic pressure from tricuspid regurgitation is moderately elevated (45-55 mmHg).    Assuming right atrial pressure of >15 mmHg (Dilated IVC at 2.5 cm with no respiratory variation) moderate to severe pulmonary hypertension (PASP 65 mmHg) is present.    There is no evidence of pericardial effusion.       Assessment / Plan         Assessment   Diagnoses and all orders for this visit:    1. Stage 3 severe COPD by GOLD classification (Primary)  Previous spirometry from 09/2024 revealed severe obstruction (FEV1/FVC 38%, FEV1 34%, and FVC 70%).   Alpha 1 genotype is normal (M/M).   Continue Trelegy 200 mcg 1 puff daily.   Continue albuterol inhaler as needed.   Continue DuoNeb treatments as needed.   Would be a good candidate for [place], will discuss at next follow-up visit.     2. Chronic respiratory failure with hypoxia and hypercapnia  Reports that he previously had BiPAP or NIV device but he wasn't able to tolerate so he returned to DME company.   Compliant with using supplemental oxygen at night and as needed (has stationary oxygen concentrator and large tanks; also has small cylinder tanks but reports these were loaned to him by a friend).   Previously ordered a POC device through [facility] but hasn't received this yet, so contacted them and they had incorrect contact info for patient. Sent new order and will reach back out.     -     Oxygen Therapy    3. Cigarette nicotine dependence without complication  Rich Calvillo  reports that he has been smoking cigarettes. He started smoking about 47 years ago. He has a 47.2 pack-year smoking history. He has been exposed to tobacco smoke. He has never used smokeless tobacco.   Previously prescribed Chantix per his PCP but decided not to start this due to side effects.   Ordered nicotine patches to help him achieve  cessation.     -     nicotine (NICODERM CQ) 14 MG/24HR patch; Place 1 patch on the skin as directed by provider Daily. (Patient not taking: Reported on 3/19/2025)  Dispense: 14 each; Refill: 2  -     nicotine (NICODERM CQ) 21 MG/24HR patch; Place 1 patch on the skin as directed by provider Daily. (Patient not taking: Reported on 3/19/2025)  Dispense: 28 each; Refill: 2  -     nicotine (NICODERM CQ) 7 MG/24HR patch; Place 1 patch on the skin as directed by provider Daily. (Patient not taking: Reported on 3/19/2025)  Dispense: 14 each; Refill: 2    4. Pulmonary hypertension (Mod-severe) - Probably WHO Group 3  Echocardiogram from 09/2024 revealed normal EF, mild to moderately dilated right atrium and ventricle, and moderate pulmonary hypertension.   Likely group 3 due to underlying severe COPD.   Continue treatment as noted above.     5. Multiple pulmonary nodules (Enlarging LLL)  Previous CT chest angiogram from hospitalization (09/04/2024) revealed new pulmonary nodule of right upper lobe measuring 10.1 mm (image 34) and left lower lobe nodule along the major fissure which is also new (image 91).   PET/CT imaging 10/2024 notable for hypermetabolism of 10 mm nodule in the right upper lobe. Recommended 3 month follow-up imaging Follow-up CT imaging 2/2025 revealed increased size of left lower lobe nodule from 1.1 cm to 2 cm stable right upper lobe nodule. And precarinal lymph node measuring 2.1 cm.   Repeat PET/CT imaging 03/2025 revealed left lower lobe nodule and right upper lobe nodule with hypermetabolism.   Followed with Dr. Holman who reviewed imaging and referred to radiation/oncology for empiric SBRT due to very high risk for biopsy procedure or general anesthesia due to severity of lung disease.   Scheduled for appointment with radiation/oncology on 03/19/2025.      Medications Discontinued During This Encounter   Medication Reason    nicotine (NICODERM CQ) 21 MG/24HR patch Reorder    nicotine (NICODERM CQ)  14 MG/24HR patch Reorder    nicotine (NICODERM CQ) 7 MG/24HR patch Reorder     New Medications Ordered This Visit   Medications    nicotine (NICODERM CQ) 14 MG/24HR patch     Sig: Place 1 patch on the skin as directed by provider Daily.     Dispense:  14 each     Refill:  2    nicotine (NICODERM CQ) 21 MG/24HR patch     Sig: Place 1 patch on the skin as directed by provider Daily.     Dispense:  28 each     Refill:  2    nicotine (NICODERM CQ) 7 MG/24HR patch     Sig: Place 1 patch on the skin as directed by provider Daily.     Dispense:  14 each     Refill:  2     I spent 35 minutes caring for Rich on this date of service. This time includes time spent by me in the following activities:preparing for the visit, reviewing tests, obtaining and/or reviewing a separately obtained history, performing a medically appropriate examination and/or evaluation , counseling and educating the patient/family/caregiver, ordering medications, tests, or procedures, referring and communicating with other health care professionals , documenting information in the medical record, independently interpreting results and communicating that information with the patient/family/caregiver, and care coordination    Follow Up   Return in about 3 months (around 6/18/2025), or if symptoms worsen or fail to improve, for Next scheduled follow up.    Patient was given instructions and counseling regarding his condition or for health maintenance advice. Please see specific information pulled into the AVS if appropriate.       This document has been electronically signed by Aminta Landa PA-C   March 20, 2025 16:05 EDT    Dictated Utilizing Dragon Dictation: Part of this note may be an electronic transcription/translation of spoken language to printed text using the Dragon Dictation System.

## 2025-03-19 ENCOUNTER — HOSPITAL ENCOUNTER (OUTPATIENT)
Dept: RADIATION ONCOLOGY | Facility: HOSPITAL | Age: 60
Discharge: HOME | End: 2025-03-19
Payer: MEDICARE

## 2025-03-19 ENCOUNTER — PATIENT ROUNDING (BHMG ONLY) (OUTPATIENT)
Dept: RADIATION ONCOLOGY | Facility: HOSPITAL | Age: 60
End: 2025-03-19

## 2025-03-19 ENCOUNTER — CONSULT (OUTPATIENT)
Dept: RADIATION ONCOLOGY | Facility: HOSPITAL | Age: 60
End: 2025-03-19
Payer: MEDICARE

## 2025-03-19 VITALS
WEIGHT: 197.4 LBS | RESPIRATION RATE: 18 BRPM | HEART RATE: 91 BPM | TEMPERATURE: 98.1 F | SYSTOLIC BLOOD PRESSURE: 131 MMHG | DIASTOLIC BLOOD PRESSURE: 76 MMHG | BODY MASS INDEX: 26.05 KG/M2 | OXYGEN SATURATION: 88 %

## 2025-03-19 DIAGNOSIS — R91.8 MULTIPLE PULMONARY NODULES: Primary | ICD-10-CM

## 2025-03-19 PROCEDURE — G0463 HOSPITAL OUTPT CLINIC VISIT: HCPCS | Performed by: RADIOLOGY

## 2025-03-19 NOTE — PROGRESS NOTES
RN completed radiation education with patient and daughter. Patient was provided with an educational folder with all paper documents. RN thoroughly educated patient about the importance of skin care beginning the day of CT Simulation, scheduled for 3-24-25 (pt aware). Patient expressed clear understanding with minimal questions at this time. Consent obtained during today's visit. Patient aware to call with any concerns.

## 2025-03-19 NOTE — PROGRESS NOTES
New Patient Office Consult      Patient Name:     Rich Calvillo  :                    1965   MRN:                    [id number]   Date of Service:  2025    Requesting Physician:   Jayson Holman MD    Reason for Referral:  Enlarging hypermetabolic nodules in the lungs worrisome for malignancy, evaluate for stereotactic body radiotherapy (SBRT)    History of Present Illness:   Mr. 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    Mr. Calvillo had an exacerbation of COPD in 2024.  A CT angiogram (2024) was compared to a prior low-dose screening chest CT scan of 2021.  No pulmonary emboli were identified.  The new findings of a 10 mm right upper lobe nodule and a 14.3 mm nodule in the left lower lung along the major fissure were reported.  Mild mediastinal adenopathy with borderline enlarged hilar nodes was reported.  A left AP window lymph node measuring 25 mm was present.    Nuclear medicine PET CT scan (10/24/2024) revealed hypermetabolism in the posterior right upper lobe with mSUV which was in the range of malignancy.  No radionuclide uptake was reported in the left lower lobe mass.    A surveillance CT scan of chest (2025) showed the right upper lobe nodule to be stable.  Enlargement of the left lower lobe mass to 20 mm was noted.  A PET CT scan (2025) showed a stable right upper lobe 10 mm nodule with SUV of 2.0, and hypermetabolism in the left lower lobe nodule with mSUV of 5.7.    Mr. Calvillo reports a chronic nonproductive cough, shortness of breath, and dyspnea on exertion.  He uses supplemental oxygen at 2 L/min on occasion.  The patient denies chest pains, hoarseness, hemoptysis, temperature elevations, recent lung infections, swallowing issues,  and nonintentional weight loss.    The patient was recently evaluated by Dr. Holman.  The patient was felt to be an anesthesia risk for fiberoptic bronchoscopy.  The right upper and left lower lobe masses are believed to be malignant in origin based on radiographic findings, enlargement of the left lower lobe lesion, and the patient's long history of tobacco use.     Mr. Calvillo is referred to our service for consideration of empiric SBRT.    Subjective      Review of Systems:   A comprehensive 14 point review of systems was performed.  All systems were negative with the exception of pulmonary findings described above, and a history of lower back pain.    Past Oncology History:   Oncology/Hematology History    No history exists.      Past Radiation History:  No previous exposures to ionizing radiation therapy and there are not relative contraindications including connective tissue disorder.     Past Medical History:   Past Medical History:   Diagnosis Date    Cancer     prostate    COPD (chronic obstructive pulmonary disease)     Hyperlipemia     Lung cancer     Wears glasses        Past Surgical History:   Past Surgical History:   Procedure Laterality Date    CARDIAC CATHETERIZATION      CYSTOSCOPY      HERNIA REPAIR Right     INGUINAL    KNEE ACL RECONSTRUCTION Right     PROSTATE BIOPSY         Family History:   Family History   Problem Relation Age of Onset    Hyperlipidemia Father     Cancer Sister     Cancer Brother         Prostate cancer                                         Father    Social History:   Social History     Socioeconomic History    Marital status: Single   Tobacco Use    Smoking status: Every Day     Current packs/day: 1.00     Average packs/day: 1 pack/day for 47.2 years (47.2 ttl pk-yrs)     Types: Cigarettes     Start date: 1978     Passive exposure: Current    Smokeless tobacco: Never   Vaping Use    Vaping status: Never Used   Substance and Sexual Activity    Alcohol use: Yes    Drug  use: No    Sexual activity: Defer     The patient resides at [place].  . Eight natural children and 1 stepdaughter-all living and apparently healthy.  Education: Sixth grade.  The patient is currently on disability secondary to pulmonary and musculoskeletal issues.  Past occupations include lua, , and .  The patient has smoked 1-1.5 packs of cigarettes per day x 47 years.  He denies use of other tobacco products and EtOH.    Medications:     Current Outpatient Medications:     albuterol sulfate  (90 Base) MCG/ACT inhaler, Inhale 2 puffs Every 6 (Six) Hours As Needed for Wheezing or Shortness of Air., Disp: 18 g, Rfl: 8    amoxicillin-clavulanate (AUGMENTIN) 875-125 MG per tablet, Take 1 tablet by mouth 2 (Two) Times a Day., Disp: 10 tablet, Rfl: 0    aspirin 81 MG EC tablet, Take 1 tablet by mouth Daily., Disp: , Rfl:     Fluticasone-Umeclidin-Vilant (Trelegy Ellipta) 200-62.5-25 MCG/ACT inhaler, Inhale 1 puff by mouth daily., Disp: 180 each, Rfl: 1    gabapentin (NEURONTIN) 300 MG capsule, Take 1 capsule by mouth 3 (Three) Times a Day As Needed (Nerve Pain)., Disp: , Rfl:     HYDROcodone-acetaminophen (NORCO)  MG per tablet, Take 1.5 tablets by mouth Daily As Needed for Mild Pain or Moderate Pain., Disp: , Rfl:     ipratropium-albuterol (DUO-NEB) 0.5-2.5 mg/3 ml nebulizer, Take 3 mL by nebulization Every 4 (Four) Hours As Needed for Wheezing or Shortness of Air., Disp: 360 mL, Rfl: 3    methocarbamol (ROBAXIN) 750 MG tablet, Take 1 tablet by mouth As Needed for Muscle Spasms., Disp: , Rfl:     predniSONE (DELTASONE) 20 MG tablet, Take 2 tablets by mouth Daily., Disp: 10 tablet, Rfl: 0    tadalafil (Cialis) 10 MG tablet, Take 1 tablet by mouth Daily for 180 days., Disp: 30 tablet, Rfl: 5    guaiFENesin (Mucinex) 600 MG 12 hr tablet, Take 1 tablet by mouth 2 (Two) Times a Day As Needed for Cough or Congestion. (Patient not taking: Reported on 3/19/2025), Disp: 20  tablet, Rfl: 3    nicotine (NICODERM CQ) 14 MG/24HR patch, Place 1 patch on the skin as directed by provider Daily. (Patient not taking: Reported on 3/19/2025), Disp: 14 each, Rfl: 2    nicotine (NICODERM CQ) 21 MG/24HR patch, Place 1 patch on the skin as directed by provider Daily. (Patient not taking: Reported on 3/19/2025), Disp: 28 each, Rfl: 2    nicotine (NICODERM CQ) 7 MG/24HR patch, Place 1 patch on the skin as directed by provider Daily. (Patient not taking: Reported on 3/19/2025), Disp: 14 each, Rfl: 2    Allergies:   Allergies   Allergen Reactions    Ciprofloxacin Itching     PHQ-9 Depression Screening  Little interest or pleasure in doing things? Not at all   Feeling down, depressed, or hopeless? Not at all   PHQ-2 Total Score 0   Trouble falling or staying asleep, or sleeping too much?     Feeling tired or having little energy?     Poor appetite or overeating?     Feeling bad about yourself - or that you are a failure or have let yourself or your family down?     Trouble concentrating on things, such as reading the newspaper or watching television?     Moving or speaking so slowly that other people could have noticed? Or the opposite - being so fidgety or restless that you have been moving around a lot more than usual?     Thoughts that you would be better off dead, or of hurting yourself in some way?     PHQ-9 Total Score     If you checked off any problems, how difficult have these problems made it for you to do your work, take care of things at home, or get along with other people?        Distress Screenin     KPS: 80 %     Imaging:  NM PET/CT Skull Base to Mid Thigh  Result Date: 3/6/2025  1. Now LEFT LOWER LOBE NODULE measures 1.7 cm with PET hypermetabolism with mSUV of 5.7. Previously no significant PET hypermetabolism above baseline. 2.  RIGHT UPPER LOBE NODULE currently measures 1 cm and was 1 cm and now with PET hypermetabolism mSUV of 2 and was 2.8. 3.  Emphysematous lungs noted.  This  report was finalized on 3/6/2025 8:45 AM by Dr. Deandre Chicas MD.      CT Chest Without Contrast Diagnostic  Result Date: 2/13/2025  1.  Other patchy subpleural airspace opacities have mostly resolved. 2.  Increased size of left lower lobe pulmonary nodule now measuring 2 cm and was 1.1 cm. 3.  Previously measured right upper lobe pulmonary nodule is grossly stable. 4.  A precarinal region lymph node measuring 2.1 cm is stable.  This report was finalized on 2/13/2025 7:34 AM by Dr. Deandre Chicas MD.       Pathology:  The lung lesions have not been biopsied for reasons described above    Objective     Physical Exam:  Mr. Calvillo is a well-nourished, well-developed white male in no acute distress.  Vital signs described below.  KPS 80.    Vital Signs:   Vitals:    03/19/25 1024   BP: 131/76   Pulse: 91   Resp: 18   Temp: 98.1 °F (36.7 °C)   TempSrc: Temporal   SpO2: (!) 88%  Comment: On O2 @ home (2L)   Weight: 89.5 kg (197 lb 6.4 oz)   PainSc: 7    PainLoc: Generalized  Comment: Back, Neck, Shoulders     Body mass index is 26.05 kg/m².     Head: Normocephalic, atraumatic.  ENT: Eyes:PERRLA, EOMs intact.  Sclera and conjunctiva clear.  Mouth: Edentulous.  No intraoral lesions noted  Neck: Short, supple, no detectable cervical or periclavicular lymphadenopathy.  No thyromegaly.  Trachea midline.  No JVD  Heart: Regular rate and rhythm  Lungs: Diminished breath sounds bilaterally.  No wheezes, rales or rhonchi  Abdomen: Soft, nontender, no organomegaly.  Normal bowel sounds present  Integumentary: No suspicious lesions noted on sun exposed skin.  Tattoo on right forearm  Extremities: Symmetric, no cyanosis, clubbing or edema digital clubbing noted  Lymphatic: No detectable cervical, periclavicular, axillary, epitrochlear or inguinal adenopathy  Musculoskeletal: No swollen or erythematous joints.  Discomfort elicited on fist percussion of lower lumbar spine.  Neurologic: Cranial nerves II through XII intact no motor,  sensory, or cerebellar deficit.  Normal gait.  Psychiatric: Normal mood and affect.  Alert and oriented x 3.    Assessment / Plan      Assessment:  Enlarging hypermetabolic nodules in the right upper and left lower lobes which are worrisome for malignancy.    Recommendations:  I have reviewed Mr. Calvillo's medical records and serial imaging studies.  The patient has a stable 10 mm hypermetabolic nodule at the right upper lobe and increase in size from 14.3 mm to 20 mm in the left lower lobe nodule.  Both lesions are hypermetabolic within the range of malignancy.  Biopsy procedures (navigational fiberoptic bronchoscopy and CT directed needle biopsies) are not felt to be indicated due to anesthesia risks and the possibility of lung collapse.    Mr. Calvillo is felt to be at risk for primary lung cancers due to his smoking history, family history of lung cancer (sister), hypermetabolism on PET scans, serial enlargement of lesions, and the size of nodules (the risk of malignancy in 8-10 mm nodules is approximately 10%, nodules > 10 mm have a risk of 15-20%).    The patient is believed to be a candidate for empiric SBRT.  The goal of treatment is to eradicate presumed malignancy in the right upper and left lower lobe masses.      I explained to Mr. Calvillo and his daughter that SBRT is a treatment modality that administers high doses of radiotherapy using multiple noncoplanar beams in a reduced number of treatment fraction to target sites.  SBRT maximizes the dose delivery to the tumor and minimizes the radiation exposure of adjacent normal tissues.  The unique radiobiologic characteristics of focused high-dose radiotherapy have been shown to provide improved tumor control rates when compared to conventionally fractionated radiotherapy.  SBRT is the treatment of choice for early nonsmall cell lung cancers and oligometastatic disease.    SBRT will consist in the administration of 5000 cGy in 1000 cGy treatment fractions  delivered on an alternate day basis to each lung nodule.  The duration of radiotherapy will be approximately a week and a half.    I reviewed the rationale for SBRT, duration of treatment, expected outcomes, possible acute and chronic side effects of treatment, and posttreatment surveillance measures with the patient.  The many questions of Mr. Calvillo and his daughter were answered to their satisfaction.  Mr. Calvillo wishes to proceed with radiotherapy as outlined.    The patient will undergo a treatment planning CT scan at the cancer center next week.  I advised the patient that the treatment planning process may take several days.  He will be contacted with the radiation therapy start date.    Time spent with patient 60 minutes (the total time included previsit review of medical records and imaging studies, obtaining an independent history of present illness from the patient, performing an appropriate medical examination/evaluation, patient counseling, and coordination of care).    Thank you very much for allowing our participation in this very pleasant gentleman's treatment.      Tyler Dalton MD   03/19/25 12:13    cc:      MD Fran Moeller DO Thomas, [place], MD JD

## 2025-03-21 ENCOUNTER — DOCUMENTATION (OUTPATIENT)
Dept: ONCOLOGY | Facility: CLINIC | Age: 60
End: 2025-03-21
Payer: MEDICARE

## 2025-03-25 ENCOUNTER — DISEASE STATE MANAGEMENT VISIT (OUTPATIENT)
Dept: PHARMACY | Facility: HOSPITAL | Age: 60
End: 2025-03-25
Payer: MEDICARE

## 2025-03-25 ENCOUNTER — DOCUMENTATION (OUTPATIENT)
Dept: ONCOLOGY | Facility: CLINIC | Age: 60
End: 2025-03-25
Payer: MEDICARE

## 2025-03-27 ENCOUNTER — HOSPITAL ENCOUNTER (OUTPATIENT)
Dept: RADIATION ONCOLOGY | Facility: HOSPITAL | Age: 60
Setting detail: RADIATION/ONCOLOGY SERIES
End: 2025-03-27
Payer: MEDICARE

## 2025-03-27 ENCOUNTER — HOSPITAL ENCOUNTER (OUTPATIENT)
Dept: RADIATION ONCOLOGY | Facility: HOSPITAL | Age: 60
Discharge: HOME | End: 2025-03-27
Payer: MEDICARE

## 2025-03-27 ENCOUNTER — DOCUMENTATION (OUTPATIENT)
Dept: ONCOLOGY | Facility: CLINIC | Age: 60
End: 2025-03-27
Payer: MEDICARE

## 2025-03-27 NOTE — PROGRESS NOTES
Consult Note    Patient is 59 y.o. male diagnosed with Multiple pulmonary nodules (Enlarging LLL).    SS received referral Ambulatory Referral to ONC Social Work [[id number]] per patient's provider.    SS attempted to contact patient this date [phone] to complete initial psychosocial without success. No VM to leave message. SS will attempt contact at later time.    Primary Care Provider: Fran Lopez DO     SS will follow and assist as needed.       VELVET Serra

## 2025-04-01 ENCOUNTER — HOSPITAL ENCOUNTER (OUTPATIENT)
Dept: RADIATION ONCOLOGY | Facility: HOSPITAL | Age: 60
Setting detail: RADIATION/ONCOLOGY SERIES
End: 2025-04-01
Payer: MEDICARE

## 2025-04-07 ENCOUNTER — DOCUMENTATION (OUTPATIENT)
Dept: ONCOLOGY | Facility: CLINIC | Age: 60
End: 2025-04-07
Payer: MEDICARE

## 2025-04-07 PROCEDURE — 77300 RADIATION THERAPY DOSE PLAN: CPT | Performed by: RADIOLOGY

## 2025-04-07 PROCEDURE — 77301 RADIOTHERAPY DOSE PLAN IMRT: CPT | Performed by: RADIOLOGY

## 2025-04-07 PROCEDURE — 77293 RESPIRATOR MOTION MGMT SIMUL: CPT | Performed by: RADIOLOGY

## 2025-04-07 PROCEDURE — 77338 DESIGN MLC DEVICE FOR IMRT: CPT | Performed by: RADIOLOGY

## 2025-04-10 ENCOUNTER — DOCUMENTATION (OUTPATIENT)
Dept: ONCOLOGY | Facility: CLINIC | Age: 60
End: 2025-04-10
Payer: MEDICARE

## 2025-04-10 NOTE — PROGRESS NOTES
"   Consult Note    Patient is 59 y.o. male diagnosed with Lung Cancer.     Patient in clinic for radiation consultation with provider.    SS received referral per patient's provider.    SS attempted to contact patient this date [phone] to complete initial psychosocial without success. SS contacted patient's daughter, Anabella [phone]. Daughter listed on patient's HIPAA form.    Role of oncology social worker introduced to daughter.    Daughter verbalizes that patient lives with her and is currently at significant others house this date.    Home Health: Patient doesn't utilize any home health services at this time.    Primary Care Provider: Fran Lopez DO     DME: Patient has home oxygen @ 2 Liters.    Transportation:  provided by self and daughter.    SS educated daughter on resources for travel assistance, i.e., [facility]. Daughter declines currently. Daughter or patient to contact  if resources needing later when he needs services at later time.    Patient completed [facility]CC[facility] Distress Thermometer scoring himself a 7 out of 10.    Supportive counseling services discussed and information provided on services.     Patient has eight children, but daughter only wishes to have her name listed.    Advance Care Planning:  The patient and I discussed care planning \"Conversations that Matter.\" This service was offered, free of charge, for development of advance directives with a certified ACP facilitator. The patient does not have an up-to-date advance directive.  The patient is not interested in an appointment with one of our facilitators to create or update their advanced directives.    Power of :None    Resources:     SS provided information to daughter on Cancer Support Group at [facility]. Support group is for any individual diagnosed with cancer and encourages our patient's to connect and network with other patient's and families to broaden their " support throughout their journey. [facility] support group is held on 2nd floor hospital board room. Meetings are held the 2nd Thursday of every month at 12:30 PM.     SS provided daughter with information on [employer] (an affiliate of the Cancer Support Community) Support and Networking Groups. This gives patients the opportunity to join a support group virtually. SS will complete referral if patient agreeable, and referral form will be HIPAA compliant. [employer] is available to patients, caregivers or survivors to allow participation.    SS provided daughter with information and phone numbers for services offered through [facility], in person or virtually, for supportive services through cancer journey    SS provided daughter with contact information and encouraged patient to call with any questions, concerns, or needs.     Daughter verbalizes being agreeable with resource assistance and plan.    SS will follow and assist as needed.       VELVET Serra

## 2025-04-10 NOTE — PROGRESS NOTES
SS attempted to contact patient without success. No VM to leave message.    SS will continue to attempt.

## 2025-04-10 NOTE — PROGRESS NOTES
SS attempted to contact patient without success. No VM to leave message.    SS will continue to follow and assist the patient as needed. Ongoing support will be provided to address any emerging concerns or needs.

## 2025-04-10 NOTE — PROGRESS NOTES
SS received referral Ambulatory Referral to ONC Social Work [[id number]] for social work assessment.    SS attempted to contact patient and patient's daughter without success. SS will continue to attempt contact.

## 2025-04-22 ENCOUNTER — HOSPITAL ENCOUNTER (OUTPATIENT)
Dept: RADIATION ONCOLOGY | Facility: HOSPITAL | Age: 60
Discharge: HOME | End: 2025-04-22

## 2025-04-22 LAB
RAD ONC ARIA COURSE ID: NORMAL
RAD ONC ARIA COURSE INTENT: NORMAL
RAD ONC ARIA COURSE LAST TREATMENT DATE: NORMAL
RAD ONC ARIA COURSE START DATE: NORMAL
RAD ONC ARIA COURSE TREATMENT ELAPSED DAYS: 0
RAD ONC ARIA FIRST TREATMENT DATE: NORMAL
RAD ONC ARIA PLAN FRACTIONS TREATED TO DATE: 1
RAD ONC ARIA PLAN FRACTIONS TREATED TO DATE: 1
RAD ONC ARIA PLAN ID: NORMAL
RAD ONC ARIA PLAN ID: NORMAL
RAD ONC ARIA PLAN PRESCRIBED DOSE PER FRACTION: 10 GY
RAD ONC ARIA PLAN PRESCRIBED DOSE PER FRACTION: 10 GY
RAD ONC ARIA PLAN PRIMARY REFERENCE POINT: NORMAL
RAD ONC ARIA PLAN PRIMARY REFERENCE POINT: NORMAL
RAD ONC ARIA PLAN TOTAL FRACTIONS PRESCRIBED: 5
RAD ONC ARIA PLAN TOTAL FRACTIONS PRESCRIBED: 5
RAD ONC ARIA PLAN TOTAL PRESCRIBED DOSE: 5000 CGY
RAD ONC ARIA PLAN TOTAL PRESCRIBED DOSE: 5000 CGY
RAD ONC ARIA REFERENCE POINT DOSAGE GIVEN TO DATE: 10 GY
RAD ONC ARIA REFERENCE POINT DOSAGE GIVEN TO DATE: 10 GY
RAD ONC ARIA REFERENCE POINT ID: NORMAL
RAD ONC ARIA REFERENCE POINT ID: NORMAL
RAD ONC ARIA REFERENCE POINT SESSION DOSAGE GIVEN: 10 GY
RAD ONC ARIA REFERENCE POINT SESSION DOSAGE GIVEN: 10 GY

## 2025-04-22 PROCEDURE — 77336 RADIATION PHYSICS CONSULT: CPT | Performed by: RADIOLOGY

## 2025-04-22 PROCEDURE — 77435 SBRT MANAGEMENT: CPT | Performed by: RADIOLOGY

## 2025-04-22 PROCEDURE — 77373 STRTCTC BDY RAD THER TX DLVR: CPT | Performed by: RADIOLOGY

## 2025-04-24 ENCOUNTER — HOSPITAL ENCOUNTER (OUTPATIENT)
Dept: RADIATION ONCOLOGY | Facility: HOSPITAL | Age: 60
Discharge: HOME | End: 2025-04-24

## 2025-04-24 LAB

## 2025-04-24 PROCEDURE — 77373 STRTCTC BDY RAD THER TX DLVR: CPT | Performed by: RADIOLOGY

## 2025-04-28 ENCOUNTER — HOSPITAL ENCOUNTER (OUTPATIENT)
Dept: RADIATION ONCOLOGY | Facility: HOSPITAL | Age: 60
Discharge: HOME | End: 2025-04-28
Payer: MEDICARE

## 2025-04-28 LAB
RAD ONC ARIA COURSE ID: NORMAL
RAD ONC ARIA COURSE INTENT: NORMAL
RAD ONC ARIA COURSE LAST TREATMENT DATE: NORMAL
RAD ONC ARIA COURSE START DATE: NORMAL
RAD ONC ARIA COURSE TREATMENT ELAPSED DAYS: 6
RAD ONC ARIA FIRST TREATMENT DATE: NORMAL
RAD ONC ARIA PLAN FRACTIONS TREATED TO DATE: 3
RAD ONC ARIA PLAN FRACTIONS TREATED TO DATE: 3
RAD ONC ARIA PLAN ID: NORMAL
RAD ONC ARIA PLAN ID: NORMAL
RAD ONC ARIA PLAN PRESCRIBED DOSE PER FRACTION: 10 GY
RAD ONC ARIA PLAN PRESCRIBED DOSE PER FRACTION: 10 GY
RAD ONC ARIA PLAN PRIMARY REFERENCE POINT: NORMAL
RAD ONC ARIA PLAN PRIMARY REFERENCE POINT: NORMAL
RAD ONC ARIA PLAN TOTAL FRACTIONS PRESCRIBED: 5
RAD ONC ARIA PLAN TOTAL FRACTIONS PRESCRIBED: 5
RAD ONC ARIA PLAN TOTAL PRESCRIBED DOSE: 5000 CGY
RAD ONC ARIA PLAN TOTAL PRESCRIBED DOSE: 5000 CGY
RAD ONC ARIA REFERENCE POINT DOSAGE GIVEN TO DATE: 30 GY
RAD ONC ARIA REFERENCE POINT DOSAGE GIVEN TO DATE: 30 GY
RAD ONC ARIA REFERENCE POINT ID: NORMAL
RAD ONC ARIA REFERENCE POINT ID: NORMAL
RAD ONC ARIA REFERENCE POINT SESSION DOSAGE GIVEN: 10 GY
RAD ONC ARIA REFERENCE POINT SESSION DOSAGE GIVEN: 10 GY

## 2025-04-28 PROCEDURE — 77373 STRTCTC BDY RAD THER TX DLVR: CPT | Performed by: RADIOLOGY

## 2025-04-30 ENCOUNTER — TELEPHONE (OUTPATIENT)
Dept: PULMONOLOGY | Facility: CLINIC | Age: 60
End: 2025-04-30

## 2025-04-30 ENCOUNTER — HOSPITAL ENCOUNTER (OUTPATIENT)
Dept: RADIATION ONCOLOGY | Facility: HOSPITAL | Age: 60
Discharge: HOME | End: 2025-04-30

## 2025-04-30 VITALS
DIASTOLIC BLOOD PRESSURE: 78 MMHG | HEART RATE: 95 BPM | BODY MASS INDEX: 24.6 KG/M2 | OXYGEN SATURATION: 83 % | TEMPERATURE: 98.8 F | RESPIRATION RATE: 18 BRPM | WEIGHT: 186.4 LBS | SYSTOLIC BLOOD PRESSURE: 121 MMHG

## 2025-04-30 DIAGNOSIS — R91.8 MULTIPLE PULMONARY NODULES: Primary | ICD-10-CM

## 2025-04-30 LAB
RAD ONC ARIA COURSE ID: NORMAL
RAD ONC ARIA COURSE INTENT: NORMAL
RAD ONC ARIA COURSE LAST TREATMENT DATE: NORMAL
RAD ONC ARIA COURSE START DATE: NORMAL
RAD ONC ARIA COURSE TREATMENT ELAPSED DAYS: 8
RAD ONC ARIA FIRST TREATMENT DATE: NORMAL
RAD ONC ARIA PLAN FRACTIONS TREATED TO DATE: 4
RAD ONC ARIA PLAN FRACTIONS TREATED TO DATE: 4
RAD ONC ARIA PLAN ID: NORMAL
RAD ONC ARIA PLAN ID: NORMAL
RAD ONC ARIA PLAN PRESCRIBED DOSE PER FRACTION: 10 GY
RAD ONC ARIA PLAN PRESCRIBED DOSE PER FRACTION: 10 GY
RAD ONC ARIA PLAN PRIMARY REFERENCE POINT: NORMAL
RAD ONC ARIA PLAN PRIMARY REFERENCE POINT: NORMAL
RAD ONC ARIA PLAN TOTAL FRACTIONS PRESCRIBED: 5
RAD ONC ARIA PLAN TOTAL FRACTIONS PRESCRIBED: 5
RAD ONC ARIA PLAN TOTAL PRESCRIBED DOSE: 5000 CGY
RAD ONC ARIA PLAN TOTAL PRESCRIBED DOSE: 5000 CGY
RAD ONC ARIA REFERENCE POINT DOSAGE GIVEN TO DATE: 40 GY
RAD ONC ARIA REFERENCE POINT DOSAGE GIVEN TO DATE: 40 GY
RAD ONC ARIA REFERENCE POINT ID: NORMAL
RAD ONC ARIA REFERENCE POINT ID: NORMAL
RAD ONC ARIA REFERENCE POINT SESSION DOSAGE GIVEN: 10 GY
RAD ONC ARIA REFERENCE POINT SESSION DOSAGE GIVEN: 10 GY

## 2025-04-30 PROCEDURE — 77373 STRTCTC BDY RAD THER TX DLVR: CPT | Performed by: RADIOLOGY

## 2025-04-30 RX ORDER — DOXYCYCLINE 100 MG/1
100 CAPSULE ORAL 2 TIMES DAILY
Qty: 10 CAPSULE | Refills: 0 | Status: SHIPPED | OUTPATIENT
Start: 2025-04-30

## 2025-04-30 RX ORDER — PREDNISONE 20 MG/1
40 TABLET ORAL DAILY
Qty: 10 TABLET | Refills: 0 | Status: SHIPPED | OUTPATIENT
Start: 2025-04-30

## 2025-04-30 NOTE — PROGRESS NOTES
On Treatment Visit Note      Patient Name: Rich Calvillo  : 1965   MRN: [id number]     Diagnosis:    Chief Complaint   Patient presents with    Lung Cancer     Multiple Pulmonary Nodules       Staging: No matching staging information was found for the patient.       This patient was seen today for an on treatment visit.  Patient relates that he was doing some coughing and called the COPD clinic who in turn put him on Augmentin and steroid.  He forgot to bring his portable O2 today to the clinic and his O2 saturation was 83%.  Despite this he is feeling quite comfortable at present other than the minor cough.  He relates that he is tolerating his SBRT which treatments extremely easily except for 1 day when he got a charley horse while trying to hold still.  He will be done with 1 more treatment on Friday    Radiation Treatments       Active   Plans   LLL SBRT   Most recent treatment: Dose planned: 1,000 cGy (fraction 4 on 2025)   Total: Dose planned: 5,000 cGy (5 fractions)   Elapsed Days: 8      RUL SBRT   Most recent treatment: Dose planned: 1,000 cGy (fraction 4 on 2025)   Total: Dose planned: 5,000 cGy (5 fractions)   Elapsed Days: 8      Reference Points   PTV LLL   Most recent treatment: Dose given: 1,000 cGy (on 2025)   Total: Dose given: 4,000 cGy   Elapsed Days: 8      PTV RUL   Most recent treatment: Dose given: 1,000 cGy (on 2025)   Total: Dose given: 4,000 cGy   Elapsed Days: 8           Historical   No historical radiation treatments to show.              Subjective      Review of Systems:   Review of Systems    Medications:     Current Outpatient Medications:     albuterol sulfate  (90 Base) MCG/ACT inhaler, Inhale 2 puffs Every 6 (Six) Hours As Needed for Wheezing or Shortness of Air., Disp: 18 g, Rfl: 8    amoxicillin-clavulanate (AUGMENTIN) 875-125 MG per tablet, Take 1 tablet by mouth 2 (Two) Times a Day., Disp: 10 tablet, Rfl: 0    aspirin 81 MG EC tablet,  Take 1 tablet by mouth Daily., Disp: , Rfl:     Fluticasone-Umeclidin-Vilant (Trelegy Ellipta) 200-62.5-25 MCG/ACT inhaler, Inhale 1 puff by mouth daily., Disp: 180 each, Rfl: 1    gabapentin (NEURONTIN) 300 MG capsule, Take 1 capsule by mouth 3 (Three) Times a Day As Needed (Nerve Pain)., Disp: , Rfl:     HYDROcodone-acetaminophen (NORCO)  MG per tablet, Take 1.5 tablets by mouth Daily As Needed for Mild Pain or Moderate Pain., Disp: , Rfl:     ipratropium-albuterol (DUO-NEB) 0.5-2.5 mg/3 ml nebulizer, Take 3 mL by nebulization Every 4 (Four) Hours As Needed for Wheezing or Shortness of Air., Disp: 360 mL, Rfl: 3    methocarbamol (ROBAXIN) 750 MG tablet, Take 1 tablet by mouth As Needed for Muscle Spasms., Disp: , Rfl:     predniSONE (DELTASONE) 20 MG tablet, Take 2 tablets by mouth Daily., Disp: 10 tablet, Rfl: 0    tadalafil (Cialis) 10 MG tablet, Take 1 tablet by mouth Daily for 180 days., Disp: 30 tablet, Rfl: 5    guaiFENesin (Mucinex) 600 MG 12 hr tablet, Take 1 tablet by mouth 2 (Two) Times a Day As Needed for Cough or Congestion. (Patient not taking: Reported on 4/30/2025), Disp: 20 tablet, Rfl: 3    nicotine (NICODERM CQ) 14 MG/24HR patch, Place 1 patch on the skin as directed by provider Daily. (Patient not taking: Reported on 4/30/2025), Disp: 14 each, Rfl: 2    nicotine (NICODERM CQ) 21 MG/24HR patch, Place 1 patch on the skin as directed by provider Daily. (Patient not taking: Reported on 4/30/2025), Disp: 28 each, Rfl: 2    nicotine (NICODERM CQ) 7 MG/24HR patch, Place 1 patch on the skin as directed by provider Daily. (Patient not taking: Reported on 4/30/2025), Disp: 14 each, Rfl: 2    Allergies:   Allergies   Allergen Reactions    Ciprofloxacin Itching     Objective     Physical Exam: Patient actually looks very comfortable and well.  There is no skin reaction in the radiation portals.  His chest is completely clear sounds diminished in all lung fields.  There is no crackles no wheezes no  rubs.  Physical Exam    Vital Signs:   Vitals:    04/30/25 1035 04/30/25 1043   BP: 121/78    Pulse: 95    Resp: 18    Temp: 98.8 °F (37.1 °C)    TempSrc: Temporal    SpO2: (!) 79%  Comment: RN & MD NOTIFIED (!) 83%   Weight: 84.6 kg (186 lb 6.4 oz)    PainSc: 8     PainLoc: Back  Comment: Shoulder      Body mass index is 24.6 kg/m².     Current Total XRT Dose (cGY):    Radiation Treatments       Active   Plans   LLL SBRT   Most recent treatment: Dose planned: 1,000 cGy (fraction 4 on 4/30/2025)   Total: Dose planned: 5,000 cGy (5 fractions)   Elapsed Days: 8      RUL SBRT   Most recent treatment: Dose planned: 1,000 cGy (fraction 4 on 4/30/2025)   Total: Dose planned: 5,000 cGy (5 fractions)   Elapsed Days: 8      Reference Points   PTV LLL   Most recent treatment: Dose given: 1,000 cGy (on 4/30/2025)   Total: Dose given: 4,000 cGy   Elapsed Days: 8      PTV RUL   Most recent treatment: Dose given: 1,000 cGy (on 4/30/2025)   Total: Dose given: 4,000 cGy   Elapsed Days: 8           Historical   No historical radiation treatments to show.              Plan      Plan:   Patient was seen today for an on treatment visit. Patient is receiving radiation therapy to the right upper lung and left lower lung nodules treated both via SBRT. Patient is stable and tolerating radiation therapy well with minimal side effects. Continue with radiation therapy.  Take Augmentin and steroid medication as prescribed by COPD clinic.  Bring portable O2 to clinic for last treatment    I have reviewed treatment setup notes, checked and approved the daily guidance images. I reviewed dose delivery, treatment parameters and deemed them appropriate. We plan to continue radiation therapy as prescribed.     Digital speech recognition software was used to dictate parts of this note, some dictation errors may occur.    This document has been electronically signed by Josue Crooks MD   April 30, 2025 10:53 EDT    Dictated Utilizing Dragon  Dictation: Part of this note may be an electronic transcription/translation of spoken language to printed text using the Dragon Dictation System.

## 2025-04-30 NOTE — TELEPHONE ENCOUNTER
Caller: DAVID MARKHAM    Relationship to patient: Emergency Contact    Best call back number: [phone] (Mobile)     Patient is needing: PT IS HAVING A COPD FLARE UP AND IS WANTING AN ANTIBOTIC / STERIOD CALLED IN     [id number] [place] [address] AT [facility] [address] - [phone]  - [phone]  [phone]

## 2025-04-30 NOTE — TELEPHONE ENCOUNTER
Caller: DAVID MARKHAM    Relationship: Emergency Contact    Best call back number: [phone]     What was the call regarding: PATIENT IS HAVING A DIFFICULT TIME AND HIS DAUGHTER HAS CALLED BACK TO CHECK ON THESE PRESCRIPTIONS. PLEASE CONTACT PATIENT ASAP AND ADVISE.

## 2025-05-01 ENCOUNTER — HOSPITAL ENCOUNTER (OUTPATIENT)
Dept: RADIATION ONCOLOGY | Facility: HOSPITAL | Age: 60
Setting detail: RADIATION/ONCOLOGY SERIES
End: 2025-05-01
Payer: MEDICARE

## 2025-05-01 ENCOUNTER — TELEPHONE (OUTPATIENT)
Dept: PULMONOLOGY | Facility: CLINIC | Age: 60
End: 2025-05-01
Payer: MEDICARE

## 2025-05-01 NOTE — TELEPHONE ENCOUNTER
Called to let patients daughter know that the requested prescriptions have been sent in and can be picked up at the patients pharmacy.

## 2025-05-02 ENCOUNTER — HOSPITAL ENCOUNTER (OUTPATIENT)
Dept: RADIATION ONCOLOGY | Facility: HOSPITAL | Age: 60
Discharge: HOME | End: 2025-05-02

## 2025-05-02 LAB
RAD ONC ARIA COURSE ID: NORMAL
RAD ONC ARIA COURSE INTENT: NORMAL
RAD ONC ARIA COURSE LAST TREATMENT DATE: NORMAL
RAD ONC ARIA COURSE START DATE: NORMAL
RAD ONC ARIA COURSE TREATMENT ELAPSED DAYS: 10
RAD ONC ARIA FIRST TREATMENT DATE: NORMAL
RAD ONC ARIA PLAN FRACTIONS TREATED TO DATE: 5
RAD ONC ARIA PLAN FRACTIONS TREATED TO DATE: 5
RAD ONC ARIA PLAN ID: NORMAL
RAD ONC ARIA PLAN ID: NORMAL
RAD ONC ARIA PLAN PRESCRIBED DOSE PER FRACTION: 10 GY
RAD ONC ARIA PLAN PRESCRIBED DOSE PER FRACTION: 10 GY
RAD ONC ARIA PLAN PRIMARY REFERENCE POINT: NORMAL
RAD ONC ARIA PLAN PRIMARY REFERENCE POINT: NORMAL
RAD ONC ARIA PLAN TOTAL FRACTIONS PRESCRIBED: 5
RAD ONC ARIA PLAN TOTAL FRACTIONS PRESCRIBED: 5
RAD ONC ARIA PLAN TOTAL PRESCRIBED DOSE: 5000 CGY
RAD ONC ARIA PLAN TOTAL PRESCRIBED DOSE: 5000 CGY
RAD ONC ARIA REFERENCE POINT DOSAGE GIVEN TO DATE: 50 GY
RAD ONC ARIA REFERENCE POINT DOSAGE GIVEN TO DATE: 50 GY
RAD ONC ARIA REFERENCE POINT ID: NORMAL
RAD ONC ARIA REFERENCE POINT ID: NORMAL
RAD ONC ARIA REFERENCE POINT SESSION DOSAGE GIVEN: 10 GY
RAD ONC ARIA REFERENCE POINT SESSION DOSAGE GIVEN: 10 GY

## 2025-05-02 PROCEDURE — 77336 RADIATION PHYSICS CONSULT: CPT | Performed by: RADIOLOGY

## 2025-06-24 ENCOUNTER — DISEASE STATE MANAGEMENT VISIT (OUTPATIENT)
Dept: PHARMACY | Facility: HOSPITAL | Age: 60
End: 2025-06-24
Payer: MEDICARE

## 2025-06-24 DIAGNOSIS — J44.1 COPD WITH EXACERBATION: ICD-10-CM

## 2025-06-24 RX ORDER — FLUTICASONE FUROATE, UMECLIDINIUM BROMIDE AND VILANTEROL TRIFENATATE 200; 62.5; 25 UG/1; UG/1; UG/1
1 POWDER RESPIRATORY (INHALATION) DAILY
Qty: 180 EACH | Refills: 1 | Status: SHIPPED | OUTPATIENT
Start: 2025-06-24

## 2025-07-30 ENCOUNTER — HOSPITAL ENCOUNTER (OUTPATIENT)
Dept: PULMONOLOGY | Facility: HOSPITAL | Age: 60
Discharge: HOME | End: 2025-07-30
Admitting: INTERNAL MEDICINE
Payer: MEDICARE

## 2025-07-30 VITALS
WEIGHT: 188 LBS | DIASTOLIC BLOOD PRESSURE: 65 MMHG | BODY MASS INDEX: 24.81 KG/M2 | HEART RATE: 88 BPM | OXYGEN SATURATION: 85 % | SYSTOLIC BLOOD PRESSURE: 108 MMHG

## 2025-07-30 DIAGNOSIS — J96.12 CHRONIC RESPIRATORY FAILURE WITH HYPOXIA AND HYPERCAPNIA: ICD-10-CM

## 2025-07-30 DIAGNOSIS — J96.11 CHRONIC RESPIRATORY FAILURE WITH HYPOXIA AND HYPERCAPNIA: ICD-10-CM

## 2025-07-30 DIAGNOSIS — I27.20 PULMONARY HYPERTENSION: ICD-10-CM

## 2025-07-30 DIAGNOSIS — F17.210 CIGARETTE NICOTINE DEPENDENCE WITHOUT COMPLICATION: ICD-10-CM

## 2025-07-30 DIAGNOSIS — J44.9 STAGE 3 SEVERE COPD BY GOLD CLASSIFICATION: Primary | ICD-10-CM

## 2025-07-30 DIAGNOSIS — R91.8 MULTIPLE PULMONARY NODULES: ICD-10-CM

## 2025-07-30 PROCEDURE — 94664 DEMO&/EVAL PT USE INHALER: CPT

## 2025-07-30 NOTE — PROGRESS NOTES
[facility]  COPD Management     Rich Calvillo is a 60 y.o. male seen in the [facility] for COPD. The patient's current COPD regimen includes: Trelegy 200, PRN Albuterol HFA, and PRN Duo-Nebs.  Pt reports fair adherence to maintenance regimen. He is a current smoker and smokes about 1.5 PPD.     Rich Calvillo reports that his breathing has been ok lately. He has only uses his oxygen when he feels        Past Medical History:   Diagnosis Date    Cancer     prostate    COPD (chronic obstructive pulmonary disease)     Hyperlipemia     Lung cancer     Wears glasses      Social History     Socioeconomic History    Marital status: Single   Tobacco Use    Smoking status: Every Day     Current packs/day: 1.00     Average packs/day: 1 pack/day for 47.6 years (47.6 ttl pk-yrs)     Types: Cigarettes     Start date: 1978     Passive exposure: Current    Smokeless tobacco: Never   Vaping Use    Vaping status: Never Used   Substance and Sexual Activity    Alcohol use: Yes    Drug use: No    Sexual activity: Defer     Ciprofloxacin    Current Outpatient Medications:     albuterol sulfate  (90 Base) MCG/ACT inhaler, Inhale 2 puffs Every 6 (Six) Hours As Needed for Wheezing or Shortness of Air., Disp: 18 g, Rfl: 8    aspirin 81 MG EC tablet, Take 1 tablet by mouth Daily., Disp: , Rfl:     Fluticasone-Umeclidin-Vilant (Trelegy Ellipta) 200-62.5-25 MCG/ACT inhaler, Inhale 1 puff by mouth daily., Disp: 180 each, Rfl: 1    gabapentin (NEURONTIN) 600 MG tablet, Take 1 tablet by mouth 3 (Three) Times a Day As Needed (Nerve Pain)., Disp: , Rfl:     guaiFENesin (Mucinex) 600 MG 12 hr tablet, Take 1 tablet by mouth 2 (Two) Times a Day As Needed for Cough or Congestion., Disp: 20 tablet, Rfl: 3    HYDROcodone-acetaminophen (NORCO)  MG per tablet, Take 1.5 tablets by mouth Daily As Needed for Mild Pain or Moderate Pain., Disp: , Rfl:     ipratropium-albuterol (DUO-NEB) 0.5-2.5 mg/3 ml  nebulizer, Take 3 mL by nebulization Every 4 (Four) Hours As Needed for Wheezing or Shortness of Air., Disp: 360 mL, Rfl: 3    tadalafil (Cialis) 10 MG tablet, Take 1 tablet by mouth Daily for 180 days., Disp: 30 tablet, Rfl: 5      Vaccination Status     Influenza: Declines, says he had a bad experience after getting   Pneumococcal: Declines  Zoster: Declines    Drug-Drug Interactions: None    Drug-Disease Interactions (non-cardioselective beta blockers): None    Patient Assistance: None at this time    Inhaler Technique Observed? No    Treatment Goals: Risk Reduction and Symptom Control     Medication Assessment & Plan:     Patient to continue Trelegy 200 mcg 1 puff daily.     Advised Pt on the importance of continuing maintenance inhaler regimen and the importance of rinsing mouth after ICS use    Aminta submitting application for Ohtuvayre.    Counseled Pt on the importance of smoking cessation. Aminta sent nicotine patches at previous appointment but they were not covered by insurance. He is trying to cut back on his own.     Thank you for allowing me to participate in the care of your patient,    Anitha Lebron, PharmD  7/30/2025  10:52 EDT

## 2025-07-30 NOTE — PROGRESS NOTES
"COPD Education    NAME:___Rich Calvillo  :_1965_    COPD Education Evaluation            COPD Education Completed (See Note) Yes     COPD Clinic encounter: 5th    Initial Referring/consulting Provider: Dr. Bender     Reason for initial Consultation:  COPD Exacerbation   COPD Diagnosis Length 20 years     Current Outpatient Pulmonologist     NO           Subjective            Spirometry      Spirometry Completed YES and 2024     FVC 70%     Fev1 34%     Fev1/FVC 38     GOLD Stage 3           Classification of Airflow Limitation Severity in COPD (Based on FEV1/FVC less than 70)           Gold 1: Mild FEV1 >= 80% predicted      Gold 2:  Moderate 50% <= FEV1 < 80% predicted   Gold 3: Severe 30% >= FEV1 < 50% predicted   Gold 4: Very Severe FEV1 < 30% predicted                Dyspnea:        Do you have Dyspnea? Yes and With Exertion.       DME Equipment Used:              Home O2? YES     Home O2 device? Home O2 Concentrator  POC     Nebulizer YES     NIPPV None      Objective:        Barriers to Learning? No    Discussed:             COPD education given via the booklet \"A Patient's Guide to COPD\".     COPD Zones: (Green/yellow/Red): YES     Exacerbation or Flare up signs and symptoms: YES       COPD Medication Non-Compliance YES       Managing Medications: YES     Patient understands use of Rescue Medications: YES     Type of Rescue inhaler patient currently has: Albuterol and Duoneb nebulizer solution   Patient understands use of Maintenance Medications: YES     Type of Maintenance inhaler patient currently has: ICS/LABA/LAMA and (Trelegy 200 mcg)    Proper MDI technique (w/wo Spacer): YES and Patient understands proper inhaler technique for his Trelegy and rescue MDI. He states he has been using his SPACER with his MDI and feels he is getting more benefit of his Rescue inhlaer.  He is able to demonstrate proper technique, however he does not like using the spacer   Provided patient a Spacer: YES and " During previous visit.       Smoking Cessation information offered to patient: NO     Nicotine Replacement Therapy Discussed NO     Breathing Techniques:            Purse-lipped breathing YES  He can demonstrate proper technique   Oxygen therapy SAFETY:  YES     COVID Vaccination Status:             Action Plan            Aerobika for sputum mobilization: NO and Aerobika during a previous visit.     Rescue regimen ordered: NO     COPD Maintenance Inhaler ordered: YES and Ohtuvayre     COPD/[facility] Health Clinic follow up scheduled: YES and 10/28/2025     Education Minutes with patient: YES and 20 minutes       Goals Discussed with patient: Become smoke free., Take medications as ordered., GO to Dr. appointments., Increase activity., Eat healthier., Increase use of pursed lip breathing., Decrease flare-ups., and Increase COPD Knowledge.    Comments:     Mr. Calvillo presents to the COPD Clinic for a follow up visit. He uses Trelegy 200 mcg for maintenance. He has Albuterol MDI and Duoneb nebulizer solution for rescue. . Mr. Calvillo is using home oxygen at night and PRN during the daytime.    During Previous visits, he completed:    -Spirometry: results shown above  -6MWT: He completed the walk test, walking a total of 315 meters. His lowest SPO2 was 77% during the walk test. He did increase back to 90%  post test on room air while recovering.   -Reviewed his Alpha 1 Genotype result: M/M,  -Began COPD Education    Today, we:    -continued discussing the COPD educational topics listed above.     SHYANN Lemos, ordered Ohtuvayre      When he returns, we will:    -review his adherence to Ohtuvayre  -Continue discussing COPD Education    CRIS Cardenas, RRT  COPD Navigator  07/30/25

## 2025-07-30 NOTE — PROGRESS NOTES
"    Subjective      Chief Complaint  COPD    Subjective      History of Present Illness  Rich Calvillo is a 60 y.o. male who presents today to [facility] with past medical history of BPH, moderate to severe pulmonary hypertension, COPD, pulmonary nodules, and tobacco abuse who presents today for COPD. This visit is a follow up appointment.     COPD:  Patient presents today for his follow-up appointment.  He reports that he is doing well.  He states that his breathing has been at baseline.  He reports that he has been mowing his and his girlfriends yard and does not have any issues.  He arrived to his visit today with oxygen saturation of 85% but reports that his oxygen runs in the 80s \"all the time.\"  He has supplemental oxygen supplies including stationary oxygen concentrator, large tanks, and POC but reports that he rarely uses.  He is compliant with using his Trelegy inhaler 1 puff daily.  He continues to smoke 1 pack to 1 and half packs per day.  He has completed his SBRT due to right upper and left lower lung nodules.  He has an appointment scheduled on August 4, 2025 for follow-up with radiation oncology.        Current Outpatient Medications:     albuterol sulfate  (90 Base) MCG/ACT inhaler, Inhale 2 puffs Every 6 (Six) Hours As Needed for Wheezing or Shortness of Air., Disp: 18 g, Rfl: 8    aspirin 81 MG EC tablet, Take 1 tablet by mouth Daily., Disp: , Rfl:     Fluticasone-Umeclidin-Vilant (Trelegy Ellipta) 200-62.5-25 MCG/ACT inhaler, Inhale 1 puff by mouth daily., Disp: 180 each, Rfl: 1    gabapentin (NEURONTIN) 600 MG tablet, Take 1 tablet by mouth 3 (Three) Times a Day As Needed (Nerve Pain)., Disp: , Rfl:     guaiFENesin (Mucinex) 600 MG 12 hr tablet, Take 1 tablet by mouth 2 (Two) Times a Day As Needed for Cough or Congestion., Disp: 20 tablet, Rfl: 3    HYDROcodone-acetaminophen (NORCO)  MG per tablet, Take 1.5 tablets by mouth Daily As Needed for Mild Pain " "or Moderate Pain., Disp: , Rfl:     ipratropium-albuterol (DUO-NEB) 0.5-2.5 mg/3 ml nebulizer, Take 3 mL by nebulization Every 4 (Four) Hours As Needed for Wheezing or Shortness of Air., Disp: 360 mL, Rfl: 3    tadalafil (Cialis) 10 MG tablet, Take 1 tablet by mouth Daily for 180 days., Disp: 30 tablet, Rfl: 5      Allergies   Allergen Reactions    Ciprofloxacin Itching       Objective     Objective   Vital Signs:  /65   Pulse 88   Wt 85.3 kg (188 lb)   SpO2 (!) 85% Comment: on room air. Patient has a portable oxygen concentrator but deborah not bring it.  BMI 24.81 kg/m²   Estimated body mass index is 24.81 kg/m² as calculated from the following:    Height as of 2/24/25: 185.4 cm (72.99\").    Weight as of this encounter: 85.3 kg (188 lb).    BMI is within normal parameters. No other follow-up for BMI required.    Past Medical History:   Diagnosis Date    Cancer     prostate    COPD (chronic obstructive pulmonary disease)     Hyperlipemia     Lung cancer     Wears glasses      Past Surgical History:   Procedure Laterality Date    CARDIAC CATHETERIZATION      CYSTOSCOPY      HERNIA REPAIR Right     INGUINAL    KNEE ACL RECONSTRUCTION Right     PROSTATE BIOPSY       Social History     Socioeconomic History    Marital status: Single   Tobacco Use    Smoking status: Every Day     Current packs/day: 1.00     Average packs/day: 1 pack/day for 47.6 years (47.6 ttl pk-yrs)     Types: Cigarettes     Start date: 1978     Passive exposure: Current    Smokeless tobacco: Never   Vaping Use    Vaping status: Never Used   Substance and Sexual Activity    Alcohol use: Yes    Drug use: No    Sexual activity: Defer      Physical Exam  Constitutional:       General: He is awake.      Appearance: Normal appearance. He is normal weight.   HENT:      Head: Normocephalic and atraumatic.      Nose: Nose normal.      Mouth/Throat:      Mouth: Mucous membranes are moist.      Pharynx: Oropharynx is clear.   Eyes:      " "Conjunctiva/sclera: Conjunctivae normal.      Pupils: Pupils are equal, round, and reactive to light.   Cardiovascular:      Rate and Rhythm: Normal rate and regular rhythm.      Pulses: Normal pulses.      Heart sounds: Normal heart sounds. No murmur heard.     No friction rub. No gallop.   Pulmonary:      Effort: Pulmonary effort is normal. No tachypnea, accessory muscle usage or respiratory distress.      Breath sounds: Normal breath sounds. No decreased breath sounds, wheezing, rhonchi or rales.   Musculoskeletal:         General: Normal range of motion.      Cervical back: Full passive range of motion without pain and normal range of motion.   Skin:     General: Skin is warm and dry.   Neurological:      General: No focal deficit present.      Mental Status: He is alert. Mental status is at baseline.   Psychiatric:         Mood and Affect: Mood normal.         Behavior: Behavior normal. Behavior is cooperative.         Thought Content: Thought content normal.          Result Review :  The following labs and radiology results have been reviewed.    Lab Review:   No results found for: \"FEV1\", \"FVC\", \"JYI4HXF\", \"TLC\", \"DLCO\"  Hospital Outpatient Visit on 05/02/2025   Component Date Value Ref Range Status    Course ID 05/02/2025 C1 Lung   Final    Course Intent 05/02/2025 Curative   Final    Course Start Date 05/02/2025 3/27/2025  3:58 PM   Final    Course First Treatment Date 05/02/2025 4/22/2025 10:19 AM   Final    Course Last Treatment Date 05/02/2025 5/2/2025 12:37 PM   Final    Course Elapsed Days 05/02/2025 10   Final    Reference Point ID 05/02/2025 PTV LLL   Final    Reference Point Dosage Given to Da* 05/02/2025 50  Gy Final    Reference Point Session Dosage Giv* 05/02/2025 10  Gy Final    Reference Point ID 05/02/2025 PTV RUL   Final    Reference Point Dosage Given to Da* 05/02/2025 50  Gy Final    Reference Point Session Dosage Giv* 05/02/2025 10  Gy Final    Plan ID 05/02/2025 LLL SBRT   Final    Plan " Fractions Treated to Date 05/02/2025 5   Final    Plan Total Fractions Prescribed 05/02/2025 5   Final    Plan Prescribed Dose Per Fraction 05/02/2025 10  Gy Final    Plan Total Prescribed Dose 05/02/2025 5,000  cGy Final    Plan Primary Reference Point 05/02/2025 PTV LLL   Final    Plan ID 05/02/2025 RUL SBRT   Final    Plan Fractions Treated to Date 05/02/2025 5   Final    Plan Total Fractions Prescribed 05/02/2025 5   Final    Plan Prescribed Dose Per Fraction 05/02/2025 10  Gy Final    Plan Total Prescribed Dose 05/02/2025 5,000  cGy Final    Plan Primary Reference Point 05/02/2025 PTV RUL   Final        Imaging Results (Most Recent)       None            Radiology Review:   Imaging Results (Last 72 Hours)       ** No results found for the last 72 hours. **          Reviewed CT chest without contrast from 2/12/2025  Narrative & Impression  EXAM:    CT Chest Without Intravenous Contrast     EXAM DATE:    2/12/2025 3:34 PM     CLINICAL HISTORY:    FU Pulmonary ndoules; R91.8-Other nonspecific abnormal finding of lung  field; J96.11-Chronic respiratory failure with hypoxia; J96.12-Chronic  respiratory failure with hypercapnia; J44.9-Chronic obstructive  pulmonary disease, unspecified; Z72.0-Tobacco use; I27.20-Pulmonary  hypertension, unspecified     TECHNIQUE:    Axial computed tomography images of the chest without intravenous  contrast.  Sagittal and coronal reformatted images were created and  reviewed.  This CT exam was performed using one or more of the following  dose reduction techniques:  automated exposure control, adjustment of  the mA and/or kV according to patient size, and/or use of iterative  reconstruction technique.     COMPARISON:    9/4/2024     FINDINGS:    LIMITATIONS:  Please note that reported measurements are made  manually, as computer generated (AI) measurements can over measure  lesions. Additionally, lesions identified by AI may have been present  presently, significant nodules will be  discussed in the report and the  visual depictions of lesions provided by AI are for general reference  only.    LUNGS AND PLEURAL SPACES:  Other patchy subpleural airspace opacities  have mostly resolved.  Increased size of left lower lobe pulmonary  nodule now measuring 2 cm and was 1.1 cm.  Previously measured right  upper lobe pulmonary nodule is grossly stable.  Fairly extensive  centrilobular emphysema.  No significant effusion.    HEART:  Unremarkable as visualized.  No cardiomegaly.  No significant  pericardial effusion.  No significant coronary artery calcifications.    MEDIASTINUM:  A precarinal region lymph node measuring 2.1 cm is  stable.    BONES/JOINTS:  Unremarkable as visualized.  No acute fracture.  No  dislocation.    SOFT TISSUES:  Unremarkable as visualized.    VASCULATURE:  Unremarkable as visualized.  No thoracic aortic  aneurysm.    LYMPH NODES:  See above.     IMPRESSION:  1.  Other patchy subpleural airspace opacities have mostly resolved.  2.  Increased size of left lower lobe pulmonary nodule now measuring 2  cm and was 1.1 cm.  3.  Previously measured right upper lobe pulmonary nodule is grossly  stable.  4.  A precarinal region lymph node measuring 2.1 cm is stable.     This report was finalized on 2/13/2025 7:34 AM by Dr. Deandre Chicas MD.     Reviewed PET/CT imaging from 03/05/2025  Narrative & Impression   EXAM:    PET/CT Skull Base to Mid Thigh     EXAM DATE:    3/6/2025 8:40 AM     CLINICAL HISTORY:    Enlargin pulmonary nodule; R91.8-Other nonspecific abnormal finding of  lung field; J44.9-Chronic obstructive pulmonary disease, unspecified;  I27.20-Pulmonary hypertension, unspecified; Z72.0-Tobacco use;  F17.210-Nicotine dependence, cigarettes, uncomplicated; J96.11-Chronic  respiratory failure with hypoxia; J96.12-Chronic respiratory failure  with hypercapnia     TECHNIQUE:    Axial Positron Emission Tomography / Computed Tomography images were  obtained from skull base to mid  thigh following the intravenous  administration of F-18 FDG. MIP reconstructed images were reviewed.     COMPARISON:    11/24/2024     FINDINGS:      HEAD:    Brain and extra-axial spaces:  Unremarkable as visualized.    Nasopharynx:  Unremarkable as visualized.      NECK:    Hypopharynx:  Unremarkable as visualized.    Larynx:  Unremarkable as visualized.    Trachea:  Unremarkable as visualized.    Retropharyngeal space:  Unremarkable as visualized.    Submandibular/parotid glands:  Unremarkable as visualized.  Glands are  normal in size.    Thyroid:  Unremarkable as visualized.  No enlarged or calcified  nodules.      CHEST:    Lungs and pleural spaces:  LEFT LOWER LOBE NODULE measures 1.7 cm with  PET hypermetabolism with mSUV of 5.7.  RIGHT UPPER LOBE NODULE currently  measures 1 cm and was 1 cm and now with PET hypermetabolism mSUV of 2  and was 2.8.  Emphysematous lungs noted.  No consolidation.  No  significant effusion.  No pneumothorax.    Heart:  Cardiomegaly.  No significant pericardial effusion.    Mediastinum:  Unremarkable as visualized.  No mass.      ABDOMEN:    Liver:  Unremarkable as visualized.    Gallbladder and bile ducts:  Unremarkable as visualized.  No calcified  stones.  No ductal dilation.    Pancreas:  Unremarkable as visualized.  No ductal dilation.    Spleen:  Unremarkable as visualized.  No splenomegaly.    Adrenals:  Unremarkable as visualized.  No mass.    Kidneys and ureters:  Unremarkable as visualized.    Stomach and bowel:  Unremarkable as visualized.  No obstruction.      PELVIS:    Appendix:  No findings to suggest acute appendicitis.    Bladder:  Unremarkable as visualized.    Reproductive:  Unremarkable as visualized.      WHOLE BODY:    Intraperitoneal space:  Unremarkable as visualized.  No significant  fluid collection.  No free air.    Bones/joints:  Unremarkable as visualized.  No acute fracture.  No  dislocation.    Soft tissues:  Unremarkable as visualized.     Vasculature:  Unremarkable as visualized.  No aortic aneurysm.    Lymph nodes:  Unremarkable as visualized.  No lymphadenopathy.  No  hypermetabolic activity.     IMPRESSION:  1. Now LEFT LOWER LOBE NODULE measures 1.7 cm with PET hypermetabolism  with mSUV of 5.7. Previously no significant PET hypermetabolism above  baseline.  2.  RIGHT UPPER LOBE NODULE currently measures 1 cm and was 1 cm and now  with PET hypermetabolism mSUV of 2 and was 2.8.  3.  Emphysematous lungs noted.     This report was finalized on 3/6/2025 8:45 AM by Dr. Deandre Chicas MD.       Results for orders placed during the hospital encounter of 09/04/24    Adult Transthoracic Echo Complete W/ Cont if Necessary Per Protocol 09/04/2024  2:01 PM    Interpretation Summary    Left ventricular systolic function is normal. Left ventricular ejection fraction appears to be 56 - 60%.    Left ventricular wall thickness is consistent with mild concentric hypertrophy.    The right ventricular cavity is mild to moderately dilated with normal systolic function.    The right atrial cavity is mild to moderately dilated in limited views.    Estimated right ventricular systolic pressure from tricuspid regurgitation is moderately elevated (45-55 mmHg).    Assuming right atrial pressure of >15 mmHg (Dilated IVC at 2.5 cm with no respiratory variation) moderate to severe pulmonary hypertension (PASP 65 mmHg) is present.    There is no evidence of pericardial effusion.       Assessment / Plan         Assessment   Diagnoses and all orders for this visit:    1. Stage 3 severe COPD by GOLD classification (Primary)  Previous spirometry from 09/2024 revealed severe obstruction (FEV1/FVC 38%, FEV1 34%, and FVC 70%).   Alpha 1 genotype is normal (M/M).   Continue albuterol inhaler as needed.  Continue DuoNeb treatments as needed.  Continue Trelegy 200 mcg inhaler 1 puff daily.  Will start on Ohtuvayre nebulizer treatments twice daily, completed patient consent and  prescription form today during visit, and will fax to [email or web address].     2. Chronic respiratory failure with hypoxia and hypercapnia  Reports that he previously had BiPAP or NIV device but he wasn't able to tolerate so he returned to DME company.   Has supplemental oxygen supplies to use at night and as needed during the day but not compliant with use. Patient arrived to visit today with saturations of 85% on room air. Educated on risks and importance of keeping saturations >88%.     3. Cigarette nicotine dependence without complication  Rich Calvillo  reports that he has been smoking cigarettes. He started smoking about 47 years ago. He has a 47.6 pack-year smoking history. He has been exposed to tobacco smoke. He has never used smokeless tobacco.   Previously prescribed Chantix per his PCP but decided not to start this due to side effects.    4. Pulmonary hypertension (Mod-severe) - Probably WHO Group 3  Echocardiogram from 09/2024 revealed normal EF, mild to moderately dilated right atrium and ventricle, and moderate pulmonary hypertension.   Likely group 3 due to underlying severe COPD.   Continue treatment as noted above.     5. Multiple pulmonary nodules (Enlarging LLL)  Previous CT chest angiogram from hospitalization (09/04/2024) revealed new pulmonary nodule of right upper lobe measuring 10.1 mm (image 34) and left lower lobe nodule along the major fissure which is also new (image 91).   PET/CT imaging 10/2024 notable for hypermetabolism of 10 mm nodule in the right upper lobe. Recommended 3 month follow-up imaging Follow-up CT imaging 2/2025 revealed increased size of left lower lobe nodule from 1.1 cm to 2 cm stable right upper lobe nodule. And precarinal lymph node measuring 2.1 cm.   Repeat PET/CT imaging 03/2025 revealed left lower lobe nodule and right upper lobe nodule with hypermetabolism.   Followed with Dr. Holman who reviewed imaging and referred to radiation/oncology for empiric SBRT  due to very high risk for biopsy procedure or general anesthesia due to severity of lung disease.   Completed SBRT and has follow-up appointment scheduled with radiation oncology on 8/4/2025.      Medications Discontinued During This Encounter   Medication Reason    doxycycline (VIBRAMYCIN) 100 MG capsule *Therapy completed    methocarbamol (ROBAXIN) 750 MG tablet Patient Reported Not Taking    nicotine (NICODERM CQ) 14 MG/24HR patch Patient Reported Not Taking    nicotine (NICODERM CQ) 21 MG/24HR patch Patient Reported Not Taking    nicotine (NICODERM CQ) 7 MG/24HR patch Patient Reported Not Taking    predniSONE (DELTASONE) 20 MG tablet *Therapy completed     No orders of the defined types were placed in this encounter.    I spent 30 minutes caring for Rich on this date of service. This time includes time spent by me in the following activities:preparing for the visit, reviewing tests, obtaining and/or reviewing a separately obtained history, performing a medically appropriate examination and/or evaluation , counseling and educating the patient/family/caregiver, ordering medications, tests, or procedures, referring and communicating with other health care professionals , documenting information in the medical record, independently interpreting results and communicating that information with the patient/family/caregiver, and care coordination    Follow Up   Return in about 3 months (around 10/30/2025), or if symptoms worsen or fail to improve, for Next scheduled follow up.    Patient was given instructions and counseling regarding his condition or for health maintenance advice. Please see specific information pulled into the AVS if appropriate.       This document has been electronically signed by Aminta Landa PA-C   July 30, 2025 17:09 EDT    Dictated Utilizing Dragon Dictation: Part of this note may be an electronic transcription/translation of spoken language to printed text using the Dragon Dictation  System.

## 2025-08-04 ENCOUNTER — OFFICE VISIT (OUTPATIENT)
Dept: RADIATION ONCOLOGY | Facility: HOSPITAL | Age: 60
End: 2025-08-04
Payer: MEDICARE

## 2025-08-04 VITALS
OXYGEN SATURATION: 88 % | TEMPERATURE: 96.9 F | DIASTOLIC BLOOD PRESSURE: 92 MMHG | SYSTOLIC BLOOD PRESSURE: 153 MMHG | HEART RATE: 86 BPM | WEIGHT: 193.6 LBS | BODY MASS INDEX: 25.55 KG/M2 | RESPIRATION RATE: 20 BRPM

## 2025-08-04 DIAGNOSIS — R91.8 MULTIPLE PULMONARY NODULES: Primary | ICD-10-CM

## 2025-08-04 PROCEDURE — G0463 HOSPITAL OUTPT CLINIC VISIT: HCPCS | Performed by: RADIOLOGY

## 2025-08-11 ENCOUNTER — PATIENT ROUNDING (BHMG ONLY) (OUTPATIENT)
Dept: RADIATION ONCOLOGY | Facility: HOSPITAL | Age: 60
End: 2025-08-11
Payer: MEDICARE

## 2025-08-18 ENCOUNTER — HOSPITAL ENCOUNTER (OUTPATIENT)
Facility: HOSPITAL | Age: 60
Discharge: HOME | End: 2025-08-18
Admitting: RADIOLOGY
Payer: MEDICARE

## 2025-08-18 DIAGNOSIS — R39.14 BENIGN PROSTATIC HYPERPLASIA WITH INCOMPLETE BLADDER EMPTYING: ICD-10-CM

## 2025-08-18 DIAGNOSIS — N40.1 BENIGN PROSTATIC HYPERPLASIA WITH INCOMPLETE BLADDER EMPTYING: ICD-10-CM

## 2025-08-18 DIAGNOSIS — R91.8 MULTIPLE PULMONARY NODULES: ICD-10-CM

## 2025-08-18 LAB — CREAT BLDA-MCNC: 0.9 MG/DL (ref 0.6–1.3)

## 2025-08-18 PROCEDURE — 82565 ASSAY OF CREATININE: CPT

## 2025-08-18 PROCEDURE — 71270 CT THORAX DX C-/C+: CPT

## 2025-08-18 PROCEDURE — 25510000001 IOPAMIDOL 61 % SOLUTION: Performed by: RADIOLOGY

## 2025-08-18 RX ORDER — IOPAMIDOL 612 MG/ML
100 INJECTION, SOLUTION INTRAVASCULAR
Status: COMPLETED | OUTPATIENT
Start: 2025-08-18 | End: 2025-08-18

## 2025-08-18 RX ORDER — TADALAFIL 10 MG/1
10 TABLET ORAL DAILY
Qty: 30 TABLET | Refills: 5 | Status: SHIPPED | OUTPATIENT
Start: 2025-08-18 | End: 2026-02-14

## 2025-08-18 RX ADMIN — IOPAMIDOL 100 ML: 612 INJECTION, SOLUTION INTRAVENOUS at 12:20
